# Patient Record
Sex: FEMALE | Race: AMERICAN INDIAN OR ALASKA NATIVE | NOT HISPANIC OR LATINO | URBAN - METROPOLITAN AREA
[De-identification: names, ages, dates, MRNs, and addresses within clinical notes are randomized per-mention and may not be internally consistent; named-entity substitution may affect disease eponyms.]

---

## 2018-08-17 ENCOUNTER — EMERGENCY (EMERGENCY)
Facility: HOSPITAL | Age: 58
LOS: 0 days | Discharge: HOME | End: 2018-08-17
Admitting: PHYSICIAN ASSISTANT

## 2018-08-17 VITALS
SYSTOLIC BLOOD PRESSURE: 159 MMHG | HEART RATE: 68 BPM | RESPIRATION RATE: 18 BRPM | TEMPERATURE: 98 F | OXYGEN SATURATION: 97 % | DIASTOLIC BLOOD PRESSURE: 75 MMHG

## 2018-08-17 DIAGNOSIS — S91.201A UNSPECIFIED OPEN WOUND OF RIGHT GREAT TOE WITH DAMAGE TO NAIL, INITIAL ENCOUNTER: ICD-10-CM
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
## 2018-08-17 DIAGNOSIS — M79.676 PAIN IN UNSPECIFIED TOE(S): ICD-10-CM
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
## 2018-08-17 RX ORDER — IBUPROFEN 200 MG
600 TABLET ORAL ONCE
Qty: 0 | Refills: 0 | Status: COMPLETED | OUTPATIENT
Start: 2018-08-17 | End: 2018-08-17

## 2018-08-17 RX ADMIN — Medication 600 MILLIGRAM(S): at 12:08

## 2018-08-17 NOTE — ED PROVIDER NOTE - NS ED ROS FT
Gen: Alert, NAD, well appearing  Ext: Tenderness to 1st digit of right foot with full avulsion of nail (pt did not bring nail) pedal pulse intact sensation intact  Neuro: AAOx3

## 2018-08-17 NOTE — ED PROVIDER NOTE - OBJECTIVE STATEMENT
Patient is a 57 years old F no pertinent pmhx  presents to the ED for evaluation of 1st digit of right foot pain after trauma to foot with avulsion of nail.

## 2018-08-17 NOTE — ED PROVIDER NOTE - PHYSICAL EXAMINATION
Gen: Alert, NAD, well appearing  Head: NC, AT, PERRL, EOMI  Ext: +nail avulsion of 1st digit of right foot   Neuro: AAOx3

## 2024-04-09 ENCOUNTER — EMERGENCY (EMERGENCY)
Facility: HOSPITAL | Age: 64
LOS: 0 days | Discharge: HOME | End: 2024-04-09
Attending: EMERGENCY MEDICINE
Payer: MEDICARE

## 2024-04-09 VITALS
TEMPERATURE: 98 F | SYSTOLIC BLOOD PRESSURE: 154 MMHG | RESPIRATION RATE: 16 BRPM | HEIGHT: 64.17 IN | OXYGEN SATURATION: 98 % | DIASTOLIC BLOOD PRESSURE: 94 MMHG | HEART RATE: 91 BPM | WEIGHT: 107.14 LBS

## 2024-04-09 DIAGNOSIS — R51.9 HEADACHE, UNSPECIFIED: ICD-10-CM

## 2024-04-09 DIAGNOSIS — H92.01 OTALGIA, RIGHT EAR: ICD-10-CM

## 2024-04-09 DIAGNOSIS — B02.9 ZOSTER WITHOUT COMPLICATIONS: ICD-10-CM

## 2024-04-09 DIAGNOSIS — R21 RASH AND OTHER NONSPECIFIC SKIN ERUPTION: ICD-10-CM

## 2024-04-09 PROCEDURE — 99284 EMERGENCY DEPT VISIT MOD MDM: CPT

## 2024-04-09 PROCEDURE — 99282 EMERGENCY DEPT VISIT SF MDM: CPT

## 2024-04-09 RX ORDER — FAMCICLOVIR 500 MG/1
1 TABLET, FILM COATED ORAL
Qty: 21 | Refills: 0
Start: 2024-04-09 | End: 2024-04-15

## 2024-04-09 RX ORDER — IBUPROFEN 200 MG
1 TABLET ORAL
Qty: 15 | Refills: 0
Start: 2024-04-09 | End: 2024-04-13

## 2024-04-09 NOTE — ED PROVIDER NOTE - NSFOLLOWUPINSTRUCTIONS_ED_ALL_ED_FT
Shingles    WHAT YOU NEED TO KNOW:    Shingles is a viral infection that causes a painful rash. Shingles is caused by the varicella-zoster virus. This is the same virus that causes chickenpox. The virus stays in your body after you have chickenpox, without causing any symptoms. Shingles occurs when the virus becomes active again. The active virus travels along a nerve to your skin and causes a rash. The rash usually lasts 2 to 3 weeks. Most people have shingles one time, but it is possible to develop it again.  Shingles         DISCHARGE INSTRUCTIONS:    Call your local emergency number (911 in the ) if:   •You have trouble moving your arms, legs, or face.      •You become confused, or have trouble speaking.      •You have a seizure.      Return to the emergency department if:   •You have weakness in an arm or leg.      •You have dizziness, a severe headache, or hearing or vision loss.      •You have painful, red, warm skin around the blisters, or the blisters drain pus.      •Your neck is stiff or you have trouble moving it.      Call your doctor if:   •A painful rash appears near your eye.      •The rash spreads to more areas and your pain worsens.      •You feel weak or have a headache.      •You have a cough, chills, or a fever.      •You have abdominal pain or nausea, or you are vomiting.      •You have questions or concerns about your condition or care.      Medicines: You may need any of the following:  •Antiviral medicine fights the virus causing your shingles. Start this medicine within 3 days after you notice the first symptoms. This may help prevent nerve pain. A shingles outbreak can cause nerve pain called post-herpetic neuralgia (PHN). PHN can last a long time after you heal from shingles.      •Topical anesthetics are used to numb the skin and decrease pain. They can be a cream, gel, spray, or patch.      •Anticonvulsants and antidepressants decrease nerve pain and may help you sleep at night.      •Antihistamines may help decrease itching.      •Acetaminophen decreases pain and fever. It is available without a doctor's order. Ask how much to take and how often to take it. Follow directions. Read the labels of all other medicines you are using to see if they also contain acetaminophen, or ask your doctor or pharmacist. Acetaminophen can cause liver damage if not taken correctly.      •NSAIDs, such as ibuprofen, help decrease swelling, pain, and fever. This medicine is available with or without a doctor's order. NSAIDs can cause stomach bleeding or kidney problems in certain people. If you take blood thinner medicine, always ask your healthcare provider if NSAIDs are safe for you. Always read the medicine label and follow directions.      •A steroid and numbing medicine injection may decrease severe pain that does not get better with other medicines.      •Take your medicine as directed. Contact your healthcare provider if you think your medicine is not helping or if you have side effects. Tell your provider if you are allergic to any medicine. Keep a list of the medicines, vitamins, and herbs you take. Include the amounts, and when and why you take them. Bring the list or the pill bottles to follow-up visits. Carry your medicine list with you in case of an emergency.      Self-care:   •Apply a cool, wet compress or take a cool bath. This may help decrease itching and pain.      •Keep your rash clean and dry. Cover your rash with a bandage. Do not use bandages with adhesive. Clothes may irritate your skin.      Prevent the spread of the shingles virus: The virus can be passed to a person who has never had chickenpox. This usually happens if the other person comes in contact with your open sores. This person may get chickenpox, but not shingles. You are contagious until your blisters scab over. Stay away from people who have not had chickenpox or the chickenpox vaccine. Avoid pregnant women, newborns, and people with weak immune systems. They have a higher risk of infection.   •Wash your hands often. Wash your hands several times each day. Wash after you use the bathroom, change a child's diaper, and before you prepare or eat food. Use soap and water every time. Rub your soapy hands together, lacing your fingers. Wash the front and back of your hands, and in between your fingers. Use the fingers of one hand to scrub under the fingernails of the other hand. Wash for at least 20 seconds. Rinse with warm, running water for several seconds. Then dry your hands with a clean towel or paper towel. Use hand  that contains alcohol if soap and water are not available. Do not touch your eyes, nose, or mouth without washing your hands first.  Handwashing           •Cover a sneeze or cough. Use a tissue that covers your mouth and nose. Throw the tissue away in a trash can right away. Use the bend of your arm if a tissue is not available. Wash your hands well with soap and water or use a hand .             Prevent shingles or another shingles outbreak:   •A vaccine may be given to help prevent shingles. You can get the vaccine even if you already had shingles. The vaccine comes in 2 forms. A 2-dose vaccine is usually given to adults 50 years or older. A 1-dose vaccine may be given to adults 60 years or older.      •The vaccine can help prevent a future outbreak. If you do get shingles again, the vaccine can keep it from becoming severe. Ask your healthcare provider about other vaccines you may need.      Follow up with your doctor as directed: Write down your questions so you remember to ask them during your visits.

## 2024-04-09 NOTE — ED PROVIDER NOTE - CLINICAL SUMMARY MEDICAL DECISION MAKING FREE TEXT BOX
63-year-old female no significant past medical history brought to the ED from home with 6 days of right-sided headache and 3 days of rash to the right side of the head.  Patient with herpes zoster.  Patient prescribed steroids and antivirals.  Patient and family educated on shingles and contagious nature of the disease.  Patient will follow-up with PMD and given return instructions.

## 2024-04-09 NOTE — ED ADULT NURSE NOTE - LANGUAGE ASSISTANCE NEEDED
estradiol (ESTRACE VAGINAL) 0.1 MG/GM vaginal cream Place 1 g vaginally Twice a Week 1 Tube 5    estradiol (ESTRACE) 1 MG tablet TAKE ONE TABLET BY MOUTH ONCE DAILY (Patient taking differently: TAKE ONE TABLET EVERY OTHER DAY) 90 tablet 3    aspirin 81 MG tablet Take 81 mg by mouth daily      diclofenac sodium 1 % GEL Apply 2 g topically 4 times daily as needed for Pain 5 Tube 11     No current facility-administered medications for this visit. No Known Allergies    Health Maintenance   Topic Date Due    Hepatitis C screen  1950    Shingles Vaccine (1 of 2 - 2 Dose Series) 07/24/1998    DTaP/Tdap/Td vaccine (1 - Tdap) 07/15/2024 (Originally 7/16/2014)    Flu vaccine (1) 09/01/2018    Potassium monitoring  01/18/2019    Creatinine monitoring  01/18/2019    Breast cancer screen  06/05/2020    Colon cancer screen colonoscopy  10/12/2021    Lipid screen  01/18/2023    DEXA (modify frequency per FRAX score)  Completed    Pneumococcal low/med risk  Completed       Subjective:      Review of Systems   Constitutional: Negative for chills and fever. HENT: Negative for congestion. Respiratory: Negative for cough, chest tightness and shortness of breath. Cardiovascular: Negative for chest pain, palpitations and leg swelling. Gastrointestinal: Negative for abdominal pain, anal bleeding, constipation, diarrhea and nausea. Genitourinary: Negative for difficulty urinating. Psychiatric/Behavioral: Negative. See HPI for visit specific review of symptoms. All others negative      Objective:   /88   Pulse 73   Temp 96.9 °F (36.1 °C)   Resp 18   Ht 5' 2\" (1.575 m)   Wt 165 lb (74.8 kg)   SpO2 97%   BMI 30.18 kg/m²   Physical Exam   Constitutional: She appears well-developed. She does not appear ill. Eyes: Pupils are equal, round, and reactive to light. Neck: Normal range of motion. Neck supple. Cardiovascular: Normal rate and regular rhythm.   Exam reveals no friction triamterene-hydrochlorothiazide (MAXZIDE) 75-50 MG per tablet; Take 0.5 tablets by mouth Twice a Week  Dispense: 90 tablet; Refill: 3    3. Gastroesophageal reflux disease without esophagitis  Continue with Zantac    4. Lipid screening  Lab Results   Component Value Date    CHOL 182 01/18/2018     Lab Results   Component Value Date    TRIG 111 01/18/2018     Lab Results   Component Value Date    HDL 57 (L) 01/18/2018     Lab Results   Component Value Date    LDLCALC 103 01/18/2018     No results found for: LABVLDL, VLDL  No results found for: CHOLHDLRATIO    - Lipid Panel; Future    Recheck lipids next visit    Suggested she continue to wean from her estradiol  Return in about 6 months (around 2/28/2019) for Routine follow up - 15 minute visit. 90 days pharmacy     Discussed use, benefit, and side effects of prescribed medications. All patient questions answered. Pt voiced understanding. Reviewed health maintenance. Instructed to continue current medications, diet and exercise. Patient agreed with treatment plan. Follow up as directed. Yes-Patient/Caregiver accepts free interpretation services...

## 2024-04-09 NOTE — ED PROVIDER NOTE - PROGRESS NOTE DETAILS
Med  Elizabeth [id number] used for history and physical and for discharge instructions. Instructions on medications and shingles and stomach upset with steroids and Motrin given to patient and daughter with Mandarin .  Patient struck to follow-up with PMD and given return instructions.

## 2024-04-09 NOTE — ED ADULT NURSE NOTE - NSFALLRISKINTERV_ED_ALL_ED

## 2024-04-09 NOTE — ED PROVIDER NOTE - OBJECTIVE STATEMENT
63-year-old female no significant past medical history brought to the ED from home by her daughter with 6 days of right-sided headache and right ear pain and noted a rash 3 days ago in the same area.  Patient was seen by the PMD, Dr. Villegas, twice over the past few days and was prescribed sumatriptan for headache with no relief.  No facial rash.  No rash on the nose.  No fever, chills.  No nausea, vomiting.  Patient unsure of a history of chickenpox.  No neck stiffness.  Patient denies any other complaints.  Patient reports difficulty sleeping due to the pain.  Patient has been taking Tylenol for the pain with no relief.

## 2024-04-09 NOTE — ED PROVIDER NOTE - PHYSICAL EXAMINATION
CONSTITUTIONAL: Well-appearing; well-nourished; in no apparent distress.   HEAD: Normocephalic; atraumatic.   EYES: PERRL; EOM intact. Conjunctiva normal B/L.   ENT: Normal pharynx with no tonsillar hypertrophy. MMM.  NECK: Supple; non-tender; no cervical lymphadenopathy.   CHEST: Normal chest excursion with respiration.   SKIN: Vesicular, grouped lesions over C 4/5 distribution on the right neck.  Some lesions scab.  No facial lesions.  No Zuñiga sign.  No oral lesions.  NEURO: A & O x 4; CN 2-12 intact. Grossly unremarkable.

## 2024-04-09 NOTE — ED PROVIDER NOTE - PATIENT PORTAL LINK FT
You can access the FollowMyHealth Patient Portal offered by [facility] by registering at the following website: [email or web address]. By joining [employer]’s FollowMyHealth portal, you will also be able to view your health information using other applications (apps) compatible with our system.

## 2024-04-11 ENCOUNTER — EMERGENCY (EMERGENCY)
Facility: HOSPITAL | Age: 64
LOS: 0 days | Discharge: HOME | End: 2024-04-12
Attending: EMERGENCY MEDICINE
Payer: MEDICARE

## 2024-04-11 VITALS
HEART RATE: 70 BPM | OXYGEN SATURATION: 97 % | SYSTOLIC BLOOD PRESSURE: 183 MMHG | DIASTOLIC BLOOD PRESSURE: 84 MMHG | TEMPERATURE: 97 F

## 2024-04-11 VITALS
TEMPERATURE: 98 F | OXYGEN SATURATION: 95 % | HEIGHT: 64.17 IN | SYSTOLIC BLOOD PRESSURE: 193 MMHG | DIASTOLIC BLOOD PRESSURE: 94 MMHG | RESPIRATION RATE: 18 BRPM | WEIGHT: 106.92 LBS | HEART RATE: 87 BPM

## 2024-04-11 DIAGNOSIS — J02.9 ACUTE PHARYNGITIS, UNSPECIFIED: ICD-10-CM

## 2024-04-11 DIAGNOSIS — B02.9 ZOSTER WITHOUT COMPLICATIONS: ICD-10-CM

## 2024-04-11 DIAGNOSIS — R21 RASH AND OTHER NONSPECIFIC SKIN ERUPTION: ICD-10-CM

## 2024-04-11 PROCEDURE — 99283 EMERGENCY DEPT VISIT LOW MDM: CPT

## 2024-04-11 PROCEDURE — 99284 EMERGENCY DEPT VISIT MOD MDM: CPT

## 2024-04-11 RX ORDER — OXYCODONE HYDROCHLORIDE 5 MG/1
1 TABLET ORAL
Qty: 12 | Refills: 0
Start: 2024-04-11 | End: 2024-04-13

## 2024-04-11 NOTE — ED ADULT NURSE NOTE - CHIEF COMPLAINT QUOTE
She has painful rashes on her right side head, neck x 7 days and now spreading to her back, getting worst and that she has hard time swallowing and her head hurts a lot as per daughter  thru . Seen 3 days ago w/ same complaints Dx shingles w/ RX given but not improving

## 2024-04-11 NOTE — ED ADULT TRIAGE NOTE - CHIEF COMPLAINT QUOTE
She has painful rashes on her right side head x 7 days and her back, now getting worst, spreading to her back and that she has hard time swallowing and her head hurts a lot as per daughter  thru . Seen 3 days ago w/ same complaints w/ RX given She has painful rashes on her right side head, neck x 7 days and now spreading to her back, getting worst and that she has hard time swallowing and her head hurts a lot as per daughter  thru . Seen 3 days ago w/ same complaints w/ RX given but not improving She has painful rashes on her right side head, neck x 7 days and now spreading to her back, getting worst and that she has hard time swallowing and her head hurts a lot as per daughter  thru . Seen 3 days ago w/ same complaints Dx shingles w/ RX given but not improving

## 2024-04-11 NOTE — ED PROVIDER NOTE - PHYSICAL EXAMINATION
CONST: Well appearing in NAD  EYES: PERRL, EOMI, Sclera and conjunctiva clear.   ENT: Oropharynx normal appearing, no erythema or exudates. Uvula midline. TM clear b/l   CARD: Normal S1 S2; Normal rate and rhythm  RESP: Equal BS B/L, No wheezes, rhonchi or rales. No distress  GI: Soft, non-tender, non-distended.  MS: Normal ROM in all extremities. No midline spinal tenderness.  SKIN: erythematous vesicular rash extending from the auricular area to te neck in a dermatomal pattern does not cross midline.   NEURO: A&Ox3, No focal deficits. Strength 5/5 with no sensory deficits. Steady gait

## 2024-04-11 NOTE — ED PROVIDER NOTE - ATTENDING APP SHARED VISIT CONTRIBUTION OF CARE
I personally evaluated the patient. I reviewed the Resident’s or Physician Assistant’s note (as assigned above), and agree with the findings and plan except as documented in my note.  63-year-old female with no significant past medical history is returning symptoms after ED visit for right sided shingles on her scalp.  Patient was discharged with famciclovir, prednisone and ibuprofen.  Patient and daughter states that she has been compliant with her medication however she is still in a lot of pain.  Any movement of her eyes, fever.  VSS, non toxic appearing, NAD, Head NCAT, MMM, neck supple, normal ROM, normal s1s2, lungs ctab, abd s/nt/nd, no guarding or rebound, extremities wnl, AAO x 3, GCS 15, neuro grossly normal.  Vesicular and papular lesions on the right scalp, down to the posterior neck, no auricular or ophthalmic involvement of lesions. Plan is improved pain control with opioids, anticipatory guidance and discharge outpatient follow-up.

## 2024-04-11 NOTE — ED ADULT NURSE NOTE - NSFALLUNIVINTERV_ED_ALL_ED
Bed/Stretcher in lowest position, wheels locked, appropriate side rails in place/Call bell, personal items and telephone in reach/Instruct patient to call for assistance before getting out of bed/chair/stretcher/Non-slip footwear applied when patient is off stretcher/[place] to call system/Physically safe environment - no spills, clutter or unnecessary equipment/Purposeful proactive rounding/Room/bathroom lighting operational, light cord in reach

## 2024-04-11 NOTE — ED PROVIDER NOTE - CLINICAL SUMMARY MEDICAL DECISION MAKING FREE TEXT BOX
Patient with diagnosis shingles on antiviral, steroids and NSAIDs is returning due to uncontrolled pain.  Patient has no concerning features namely auricular lesions or zoster ophthalmic rest.  Spoke with patient and daughter using Mandarin  and gave anticipatory guidance.  Patient treated with Percocet in the ED and will be discharged with outpatient follow-up.

## 2024-04-11 NOTE — ED PROVIDER NOTE - OBJECTIVE STATEMENT
63-year-old female no significant past medical history presents to the ED for evaluation of rash.  Patient with approximately 1 week of right-sided ear pain and rash to face now spreading to the neck and chest over the last several days.  Was seen in the ED 3 days ago prescribed antivirals, steroids and ibuprofen which they have been taking as prescribed however rash continues to worsen and pain has not been well-controlled.  Patient also with sore throat.  Denies any fevers, chills, nausea, vomiting, vision changes, neck stiffness, diarrhea.

## 2024-04-11 NOTE — ED PROVIDER NOTE - PATIENT PORTAL LINK FT
You can access the FollowMyHealth Patient Portal offered by [facility] by registering at the following website: [email or web address]. By joining [employer]’s FollowMyHealth portal, you will also be able to view your health information using other applications (apps) compatible with our system.

## 2024-08-28 NOTE — ED ADULT NURSE NOTE - NEGATIVE SCREENING
Preethi Roberto is a 51 y.o. female and presents with Follow-Up from Hospital (PE)  .  Subjective:    Hypertension Review:  The patient has hypertension .  Diet and Lifestyle: generally follows a low sodium diet, exercises sporadically  Home BP Monitoring: is not measured at home.  Pertinent ROS: taking medications as instructed, no medication side effects noted, no TIA's, no chest pain on exertion, no dyspnea on exertion, no swelling of ankles.    Dyslipidemia Review:  Patient presents for evaluation of lipids.  Compliance with treatment thus far has been excellent.  A repeat fasting lipid profile was done.  The patient does not use medications that may worsen dyslipidemias . The patient exercises sporadically.    She has a PE and is on Eliquis    Anxiety review:  Patient complains of difficulty concentrating, feelings of losing control, irritable, psychomotor agitation, racing thoughts  Treatment includes  none  and none.   She denies recurrent thoughts of death and suicidal thoughts without plan.   She experiences the following side effects from the treatment: none.         Review of Systems  Constitutional: negative for fevers, chills, anorexia and weight loss  Eyes:   negative for visual disturbance and irritation  ENT:   negative for tinnitus,sore throat,nasal congestion,ear pains.hoarseness  Respiratory:  negative for cough, hemoptysis, dyspnea,wheezing  CV:   negative for chest pain, palpitations, lower extremity edema  GI:   negative for nausea, vomiting, diarrhea, abdominal pain,melena  Endo:               negative for polyuria,polydipsia,polyphagia,heat intolerance  Genitourinary: negative for frequency, dysuria and hematuria  Integument:  negative for rash and pruritus  Hematologic:  negative for easy bruising and gum/nose bleeding  Musculoskel: negative for myalgias, arthralgias, back pain, muscle weakness, joint pain  Neurological:  negative for headaches, dizziness, vertigo, memory problems and gait  . noted      No results found for this visit on 08/28/24.    Assessment/Plan:    ICD-10-CM    1. Hx pulmonary embolism  Z86.711       2. Hospital discharge follow-up  Z09 TX DISCHARGE MEDS RECONCILED W/ CURRENT OUTPATIENT MED LIST      3. Morbid (severe) obesity due to excess calories (HCC)  E66.01       4. Essential (primary) hypertension  I10       5. Anxiety and depression  F41.9     F32.A         Orders Placed This Encounter   Procedures    TX DISCHARGE MEDS RECONCILED W/ CURRENT OUTPATIENT MED LIST     call if any problems,  There are no Patient Instructions on file for this visit.   Follow-up and Dispositions    Return in about 4 weeks (around 9/25/2024), or if symptoms worsen or fail to improve.           I have reviewed with the patient details of the assessment and plan and all questions were answered. Relevent patient education was performed.The most recent lab findings were reviewed with the patient.    An After Visit Summary was printed and given to the patient.

## 2024-11-25 ENCOUNTER — OFFICE (OUTPATIENT)
Dept: URBAN - METROPOLITAN AREA CLINIC 76 | Facility: CLINIC | Age: 64
Setting detail: OPHTHALMOLOGY
End: 2024-11-25
Payer: COMMERCIAL

## 2024-11-25 ENCOUNTER — RX ONLY (RX ONLY)
Age: 64
End: 2024-11-25

## 2024-11-25 DIAGNOSIS — H40.033: ICD-10-CM

## 2024-11-25 DIAGNOSIS — S05.02XA: ICD-10-CM

## 2024-11-25 DIAGNOSIS — T15.12XA: ICD-10-CM

## 2024-11-25 PROBLEM — H43.811 POSTERIOR VITREOUS DETACHMENT; RIGHT EYE: Status: ACTIVE | Noted: 2024-11-25

## 2024-11-25 PROBLEM — Z96.1 PSEUDOPHAKIA ; BOTH EYES: Status: ACTIVE | Noted: 2024-11-25

## 2024-11-25 PROCEDURE — 92004 COMPRE OPH EXAM NEW PT 1/>: CPT | Performed by: OPTOMETRIST

## 2024-11-25 PROCEDURE — 65205 REMOVE FOREIGN BODY FROM EYE: CPT | Mod: LT | Performed by: OPTOMETRIST

## 2024-11-25 ASSESSMENT — CORNEAL EDEMA - FOLDS/STRIAE: OS_FOLDSSTRIAE: 1+

## 2024-11-25 ASSESSMENT — CORNEAL TRAUMA - ABRASION: OS_ABRASION: PRESENT

## 2024-11-25 ASSESSMENT — REFRACTION_AUTOREFRACTION
OS_SPHERE: +1.00
OD_CYLINDER: -0.75
OS_AXIS: 98
OD_AXIS: 100
OD_SPHERE: -0.75
OS_CYLINDER: -1.50

## 2024-11-25 ASSESSMENT — LID EXAM ASSESSMENTS: OS_EDEMA: LUL 1+

## 2024-11-25 ASSESSMENT — CONFRONTATIONAL VISUAL FIELD TEST (CVF)
OD_FINDINGS: FULL
OS_FINDINGS: FULL

## 2024-11-25 ASSESSMENT — VISUAL ACUITY
OD_BCVA: 20/200
OS_BCVA: 20/60

## 2024-11-25 ASSESSMENT — KERATOMETRY
OS_AXISANGLE_DEGREES: 4
OD_AXISANGLE_DEGREES: 6
OS_K2POWER_DIOPTERS: 44.75
OD_K1POWER_DIOPTERS: 44.50
OD_K2POWER_DIOPTERS: 45.00
OS_K1POWER_DIOPTERS: 43.25

## 2024-11-25 ASSESSMENT — CORNEAL TRAUMA - FOREIGN BODY: OS_FOREIGNBODY: PRESENT

## 2024-11-25 ASSESSMENT — CORNEAL EDEMA CLINICAL DESCRIPTION: OS_CORNEALEDEMA: TEMPORAL

## 2024-11-25 ASSESSMENT — CORNEAL TRAUMA: OS_TRAUMA: CLOCK

## 2025-04-24 ENCOUNTER — INPATIENT (INPATIENT)
Facility: HOSPITAL | Age: 65
LOS: 3 days | Discharge: HOME | DRG: 603 | End: 2025-04-28
Attending: INTERNAL MEDICINE | Admitting: STUDENT IN AN ORGANIZED HEALTH CARE EDUCATION/TRAINING PROGRAM
Payer: MEDICARE

## 2025-04-24 VITALS
TEMPERATURE: 98 F | DIASTOLIC BLOOD PRESSURE: 83 MMHG | RESPIRATION RATE: 18 BRPM | WEIGHT: 104.94 LBS | SYSTOLIC BLOOD PRESSURE: 135 MMHG | HEART RATE: 114 BPM | OXYGEN SATURATION: 100 %

## 2025-04-24 DIAGNOSIS — L03.90 CELLULITIS, UNSPECIFIED: ICD-10-CM

## 2025-04-24 LAB
ALBUMIN SERPL ELPH-MCNC: 4.1 G/DL — SIGNIFICANT CHANGE UP (ref 3.5–5.2)
ALP SERPL-CCNC: 105 U/L — SIGNIFICANT CHANGE UP (ref 30–115)
ALT FLD-CCNC: 23 U/L — SIGNIFICANT CHANGE UP (ref 0–41)
ANION GAP SERPL CALC-SCNC: 11 MMOL/L — SIGNIFICANT CHANGE UP (ref 7–14)
APTT BLD: 31.5 SEC — SIGNIFICANT CHANGE UP (ref 27–39.2)
AST SERPL-CCNC: 20 U/L — SIGNIFICANT CHANGE UP (ref 0–41)
BASOPHILS # BLD AUTO: 0.02 K/UL — SIGNIFICANT CHANGE UP (ref 0–0.2)
BASOPHILS NFR BLD AUTO: 0.2 % — SIGNIFICANT CHANGE UP (ref 0–1)
BILIRUB SERPL-MCNC: 0.6 MG/DL — SIGNIFICANT CHANGE UP (ref 0.2–1.2)
BUN SERPL-MCNC: 13 MG/DL — SIGNIFICANT CHANGE UP (ref 10–20)
CALCIUM SERPL-MCNC: 9.4 MG/DL — SIGNIFICANT CHANGE UP (ref 8.4–10.5)
CHLORIDE SERPL-SCNC: 100 MMOL/L — SIGNIFICANT CHANGE UP (ref 98–110)
CO2 SERPL-SCNC: 25 MMOL/L — SIGNIFICANT CHANGE UP (ref 17–32)
CREAT SERPL-MCNC: 0.6 MG/DL — LOW (ref 0.7–1.5)
CRP SERPL-MCNC: 66.2 MG/L — HIGH
EGFR: 99 ML/MIN/1.73M2 — SIGNIFICANT CHANGE UP
EGFR: 99 ML/MIN/1.73M2 — SIGNIFICANT CHANGE UP
EOSINOPHIL # BLD AUTO: 0.05 K/UL — SIGNIFICANT CHANGE UP (ref 0–0.7)
EOSINOPHIL NFR BLD AUTO: 0.5 % — SIGNIFICANT CHANGE UP (ref 0–8)
ERYTHROCYTE [SEDIMENTATION RATE] IN BLOOD: 87 MM/HR — HIGH (ref 0–20)
GLUCOSE SERPL-MCNC: 116 MG/DL — HIGH (ref 70–99)
HCT VFR BLD CALC: 38.7 % — SIGNIFICANT CHANGE UP (ref 37–47)
HGB BLD-MCNC: 12.7 G/DL — SIGNIFICANT CHANGE UP (ref 12–16)
IMM GRANULOCYTES NFR BLD AUTO: 0.3 % — SIGNIFICANT CHANGE UP (ref 0.1–0.3)
INR BLD: 0.94 RATIO — SIGNIFICANT CHANGE UP (ref 0.65–1.3)
LACTATE SERPL-SCNC: 1.1 MMOL/L — SIGNIFICANT CHANGE UP (ref 0.7–2)
LYMPHOCYTES # BLD AUTO: 1.35 K/UL — SIGNIFICANT CHANGE UP (ref 1.2–3.4)
LYMPHOCYTES # BLD AUTO: 14.4 % — LOW (ref 20.5–51.1)
MCHC RBC-ENTMCNC: 30.2 PG — SIGNIFICANT CHANGE UP (ref 27–31)
MCHC RBC-ENTMCNC: 32.8 G/DL — SIGNIFICANT CHANGE UP (ref 32–37)
MCV RBC AUTO: 91.9 FL — SIGNIFICANT CHANGE UP (ref 81–99)
MONOCYTES # BLD AUTO: 1.13 K/UL — HIGH (ref 0.1–0.6)
MONOCYTES NFR BLD AUTO: 12 % — HIGH (ref 1.7–9.3)
NEUTROPHILS # BLD AUTO: 6.81 K/UL — HIGH (ref 1.4–6.5)
NEUTROPHILS NFR BLD AUTO: 72.6 % — SIGNIFICANT CHANGE UP (ref 42.2–75.2)
NRBC BLD AUTO-RTO: 0 /100 WBCS — SIGNIFICANT CHANGE UP (ref 0–0)
PLATELET # BLD AUTO: 260 K/UL — SIGNIFICANT CHANGE UP (ref 130–400)
PMV BLD: 9.4 FL — SIGNIFICANT CHANGE UP (ref 7.4–10.4)
POTASSIUM SERPL-MCNC: 5 MMOL/L — SIGNIFICANT CHANGE UP (ref 3.5–5)
POTASSIUM SERPL-SCNC: 5 MMOL/L — SIGNIFICANT CHANGE UP (ref 3.5–5)
PROT SERPL-MCNC: 7.7 G/DL — SIGNIFICANT CHANGE UP (ref 6–8)
PROTHROM AB SERPL-ACNC: 11.1 SEC — SIGNIFICANT CHANGE UP (ref 9.95–12.87)
RBC # BLD: 4.21 M/UL — SIGNIFICANT CHANGE UP (ref 4.2–5.4)
RBC # FLD: 14.6 % — HIGH (ref 11.5–14.5)
SODIUM SERPL-SCNC: 136 MMOL/L — SIGNIFICANT CHANGE UP (ref 135–146)
URATE SERPL-MCNC: 1.8 MG/DL — LOW (ref 2.5–7)
WBC # BLD: 9.39 K/UL — SIGNIFICANT CHANGE UP (ref 4.8–10.8)
WBC # FLD AUTO: 9.39 K/UL — SIGNIFICANT CHANGE UP (ref 4.8–10.8)

## 2025-04-24 PROCEDURE — 85610 PROTHROMBIN TIME: CPT

## 2025-04-24 PROCEDURE — 84145 PROCALCITONIN (PCT): CPT

## 2025-04-24 PROCEDURE — 86140 C-REACTIVE PROTEIN: CPT

## 2025-04-24 PROCEDURE — 83036 HEMOGLOBIN GLYCOSYLATED A1C: CPT

## 2025-04-24 PROCEDURE — 73201 CT UPPER EXTREMITY W/DYE: CPT | Mod: 26,RT

## 2025-04-24 PROCEDURE — 73110 X-RAY EXAM OF WRIST: CPT | Mod: 26,RT

## 2025-04-24 PROCEDURE — 80202 ASSAY OF VANCOMYCIN: CPT

## 2025-04-24 PROCEDURE — T1013: CPT

## 2025-04-24 PROCEDURE — 85025 COMPLETE CBC W/AUTO DIFF WBC: CPT

## 2025-04-24 PROCEDURE — 73130 X-RAY EXAM OF HAND: CPT | Mod: 26,RT

## 2025-04-24 PROCEDURE — 80053 COMPREHEN METABOLIC PANEL: CPT

## 2025-04-24 PROCEDURE — 83735 ASSAY OF MAGNESIUM: CPT

## 2025-04-24 PROCEDURE — 85652 RBC SED RATE AUTOMATED: CPT

## 2025-04-24 PROCEDURE — 73090 X-RAY EXAM OF FOREARM: CPT | Mod: 26,RT

## 2025-04-24 PROCEDURE — 82962 GLUCOSE BLOOD TEST: CPT

## 2025-04-24 PROCEDURE — 99223 1ST HOSP IP/OBS HIGH 75: CPT

## 2025-04-24 PROCEDURE — 86850 RBC ANTIBODY SCREEN: CPT

## 2025-04-24 PROCEDURE — 86901 BLOOD TYPING SEROLOGIC RH(D): CPT

## 2025-04-24 PROCEDURE — 73201 CT UPPER EXTREMITY W/DYE: CPT | Mod: MC,RT

## 2025-04-24 PROCEDURE — 36415 COLL VENOUS BLD VENIPUNCTURE: CPT

## 2025-04-24 PROCEDURE — 86900 BLOOD TYPING SEROLOGIC ABO: CPT

## 2025-04-24 PROCEDURE — 99285 EMERGENCY DEPT VISIT HI MDM: CPT

## 2025-04-24 RX ORDER — ASPIRIN 325 MG
1 TABLET ORAL
Refills: 0 | DISCHARGE

## 2025-04-24 RX ORDER — EZETIMIBE 10 MG/1
10 TABLET ORAL DAILY
Refills: 0 | Status: DISCONTINUED | OUTPATIENT
Start: 2025-04-24 | End: 2025-04-28

## 2025-04-24 RX ORDER — SODIUM CHLORIDE 9 G/1000ML
1000 INJECTION, SOLUTION INTRAVENOUS
Refills: 0 | Status: DISCONTINUED | OUTPATIENT
Start: 2025-04-24 | End: 2025-04-28

## 2025-04-24 RX ORDER — PIPERACILLIN-TAZO-DEXTROSE,ISO 2.25G/50ML
3.38 IV SOLUTION, PIGGYBACK PREMIX FROZEN(ML) INTRAVENOUS ONCE
Refills: 0 | Status: COMPLETED | OUTPATIENT
Start: 2025-04-25 | End: 2025-04-25

## 2025-04-24 RX ORDER — LOSARTAN POTASSIUM 100 MG/1
1 TABLET, FILM COATED ORAL
Refills: 0 | DISCHARGE

## 2025-04-24 RX ORDER — EZETIMIBE 10 MG/1
1 TABLET ORAL
Refills: 0 | DISCHARGE

## 2025-04-24 RX ORDER — VANCOMYCIN HCL IN 5 % DEXTROSE 1.5G/250ML
500 PLASTIC BAG, INJECTION (ML) INTRAVENOUS EVERY 12 HOURS
Refills: 0 | Status: DISCONTINUED | OUTPATIENT
Start: 2025-04-25 | End: 2025-04-25

## 2025-04-24 RX ORDER — GABAPENTIN 400 MG/1
2 CAPSULE ORAL
Refills: 0 | DISCHARGE

## 2025-04-24 RX ORDER — PIPERACILLIN-TAZO-DEXTROSE,ISO 2.25G/50ML
3.38 IV SOLUTION, PIGGYBACK PREMIX FROZEN(ML) INTRAVENOUS ONCE
Refills: 0 | Status: COMPLETED | OUTPATIENT
Start: 2025-04-24 | End: 2025-04-24

## 2025-04-24 RX ORDER — INSULIN LISPRO 100 U/ML
INJECTION, SOLUTION INTRAVENOUS; SUBCUTANEOUS
Refills: 0 | Status: DISCONTINUED | OUTPATIENT
Start: 2025-04-24 | End: 2025-04-28

## 2025-04-24 RX ORDER — GABAPENTIN 400 MG/1
200 CAPSULE ORAL DAILY
Refills: 0 | Status: DISCONTINUED | OUTPATIENT
Start: 2025-04-24 | End: 2025-04-28

## 2025-04-24 RX ORDER — SODIUM CHLORIDE 9 G/1000ML
1000 INJECTION, SOLUTION INTRAVENOUS
Refills: 0 | Status: DISCONTINUED | OUTPATIENT
Start: 2025-04-24 | End: 2025-04-25

## 2025-04-24 RX ORDER — VANCOMYCIN HCL IN 5 % DEXTROSE 1.5G/250ML
PLASTIC BAG, INJECTION (ML) INTRAVENOUS
Refills: 0 | Status: DISCONTINUED | OUTPATIENT
Start: 2025-04-24 | End: 2025-04-24

## 2025-04-24 RX ORDER — ASPIRIN 325 MG
81 TABLET ORAL DAILY
Refills: 0 | Status: DISCONTINUED | OUTPATIENT
Start: 2025-04-24 | End: 2025-04-28

## 2025-04-24 RX ORDER — ROSUVASTATIN CALCIUM 20 MG/1
40 TABLET, FILM COATED ORAL AT BEDTIME
Refills: 0 | Status: DISCONTINUED | OUTPATIENT
Start: 2025-04-24 | End: 2025-04-28

## 2025-04-24 RX ORDER — ENOXAPARIN SODIUM 100 MG/ML
40 INJECTION SUBCUTANEOUS EVERY 24 HOURS
Refills: 0 | Status: DISCONTINUED | OUTPATIENT
Start: 2025-04-24 | End: 2025-04-28

## 2025-04-24 RX ORDER — MEROPENEM 1 G/30ML
INJECTION INTRAVENOUS
Refills: 0 | Status: DISCONTINUED | OUTPATIENT
Start: 2025-04-24 | End: 2025-04-24

## 2025-04-24 RX ORDER — DEXTROSE 50 % IN WATER 50 %
12.5 SYRINGE (ML) INTRAVENOUS ONCE
Refills: 0 | Status: DISCONTINUED | OUTPATIENT
Start: 2025-04-24 | End: 2025-04-28

## 2025-04-24 RX ORDER — METFORMIN HYDROCHLORIDE 850 MG/1
1 TABLET ORAL
Refills: 0 | DISCHARGE

## 2025-04-24 RX ORDER — MEROPENEM 1 G/30ML
1000 INJECTION INTRAVENOUS EVERY 8 HOURS
Refills: 0 | Status: DISCONTINUED | OUTPATIENT
Start: 2025-04-24 | End: 2025-04-24

## 2025-04-24 RX ORDER — VANCOMYCIN HCL IN 5 % DEXTROSE 1.5G/250ML
1000 PLASTIC BAG, INJECTION (ML) INTRAVENOUS ONCE
Refills: 0 | Status: DISCONTINUED | OUTPATIENT
Start: 2025-04-24 | End: 2025-04-24

## 2025-04-24 RX ORDER — PIPERACILLIN-TAZO-DEXTROSE,ISO 2.25G/50ML
3.38 IV SOLUTION, PIGGYBACK PREMIX FROZEN(ML) INTRAVENOUS EVERY 8 HOURS
Refills: 0 | Status: DISCONTINUED | OUTPATIENT
Start: 2025-04-25 | End: 2025-04-25

## 2025-04-24 RX ORDER — MEROPENEM 1 G/30ML
1000 INJECTION INTRAVENOUS ONCE
Refills: 0 | Status: COMPLETED | OUTPATIENT
Start: 2025-04-24 | End: 2025-04-24

## 2025-04-24 RX ORDER — LOSARTAN POTASSIUM 100 MG/1
25 TABLET, FILM COATED ORAL DAILY
Refills: 0 | Status: DISCONTINUED | OUTPATIENT
Start: 2025-04-24 | End: 2025-04-28

## 2025-04-24 RX ORDER — ROSUVASTATIN CALCIUM 20 MG/1
1 TABLET, FILM COATED ORAL
Refills: 0 | DISCHARGE

## 2025-04-24 RX ORDER — DEXTROSE 50 % IN WATER 50 %
25 SYRINGE (ML) INTRAVENOUS ONCE
Refills: 0 | Status: DISCONTINUED | OUTPATIENT
Start: 2025-04-24 | End: 2025-04-28

## 2025-04-24 RX ORDER — DEXTROSE 50 % IN WATER 50 %
15 SYRINGE (ML) INTRAVENOUS ONCE
Refills: 0 | Status: DISCONTINUED | OUTPATIENT
Start: 2025-04-24 | End: 2025-04-28

## 2025-04-24 RX ORDER — CEFAZOLIN SODIUM IN 0.9 % NACL 3 G/100 ML
2000 INTRAVENOUS SOLUTION, PIGGYBACK (ML) INTRAVENOUS ONCE
Refills: 0 | Status: COMPLETED | OUTPATIENT
Start: 2025-04-24 | End: 2025-04-24

## 2025-04-24 RX ORDER — ACETAMINOPHEN 500 MG/5ML
650 LIQUID (ML) ORAL EVERY 6 HOURS
Refills: 0 | Status: DISCONTINUED | OUTPATIENT
Start: 2025-04-24 | End: 2025-04-28

## 2025-04-24 RX ORDER — VANCOMYCIN HCL IN 5 % DEXTROSE 1.5G/250ML
1000 PLASTIC BAG, INJECTION (ML) INTRAVENOUS ONCE
Refills: 0 | Status: COMPLETED | OUTPATIENT
Start: 2025-04-24 | End: 2025-04-24

## 2025-04-24 RX ORDER — GLUCAGON 3 MG/1
1 POWDER NASAL ONCE
Refills: 0 | Status: DISCONTINUED | OUTPATIENT
Start: 2025-04-24 | End: 2025-04-28

## 2025-04-24 RX ADMIN — Medication 1000 MILLILITER(S): at 12:39

## 2025-04-24 RX ADMIN — Medication 250 MILLIGRAM(S): at 18:41

## 2025-04-24 RX ADMIN — Medication 200 GRAM(S): at 15:58

## 2025-04-24 RX ADMIN — Medication 1000 MILLILITER(S): at 13:39

## 2025-04-24 RX ADMIN — Medication 100 MILLIGRAM(S): at 12:39

## 2025-04-24 RX ADMIN — MEROPENEM 100 MILLIGRAM(S): 1 INJECTION INTRAVENOUS at 18:30

## 2025-04-24 RX ADMIN — LOSARTAN POTASSIUM 25 MILLIGRAM(S): 100 TABLET, FILM COATED ORAL at 18:51

## 2025-04-24 NOTE — PHARMACOTHERAPY INTERVENTION NOTE - NSPHARMCOMMASP
ASP - Lab/ test recommended
ASP - Dose optimization/Non-Renal dose adjustment
ASP - Lab/ test recommended
ASP - Therapy recommended/ Alternative therapy

## 2025-04-24 NOTE — CONSULT NOTE ADULT - SUBJECTIVE AND OBJECTIVE BOX
HAND SURGERY CONSULT NOTE    Patient: CASSIDY ESCALERA , 64y ([birth year])Female   MRN: [id number]  Location: [facility] ED  Visit: 04-24-25 Emergency  Date: 04-24-25 @ 16:16    HPI:  The patient is a 64 year old female with no PMH presenting with 4 days of worsening left hand swelling. She says she was gardening on sunday and using her left hand a lot and the pain began that evening and has gotten worse. she denies any trauma to the hand including any puncture wounds or cuts. She went to her PCP on Monday and received a course of PO antibiotics (she doesn't remember the name of the antibiotic) and since then it has only gotten worse. she denies any fevers or chills, denies any numbness in the hand. She says she is having trouble moving her hand at the wrist and flexing/extending her fingers. Her daughter brings her to the ED for evaluation where she is afebrile, hemodynamically stable. Labs notable for WBC 9.4k, CRP elevated at 66, ESR 87. Xrays of the hand show soft tissue swelling without evidence of foreign body. Hand surgery consulted for evaluation.     PAST MEDICAL & SURGICAL HISTORY:  No pertinent past medical history          Home Medications:  none      VITALS:  T(F): 98.2 (04-24-25 @ 10:20), Max: 98.2 (04-24-25 @ 10:20)  HR: 114 (04-24-25 @ 10:20) (114 - 114)  BP: 166/90 (04-24-25 @ 13:45) (135/83 - 166/90)  RR: 18 (04-24-25 @ 10:20) (18 - 18)  SpO2: 100% (04-24-25 @ 10:20) (100% - 100%)    PHYSICAL EXAM:    General: well appearing, no acute distress  HEENT: pupils equal and reactive, EOM intact, mucous membranes moist, no scleral icterus  CV: RRR on radial exam  Pulm: breathing well on room air, no acute respiratory distress  Abdomen: soft, nontender, no rebound or guarding  Ext: well perfused. There is erythema to the dorsal aspect  of the left hand/forearm and mild circumferential digital swelling. Her fingers are held in slight flexion, there is pain with passive flexion and extension. There is a small area of bruising to the volar aspect of the left thumb and a small healed abrasion to the distal thumb. She is moderately tender throughout the hand and fingers.   Neuro: alert and oriented x3, no focal sensory/motor deficits    MEDICATIONS  (STANDING):  vancomycin  IVPB. 1000 milliGRAM(s) IV Intermittent once    MEDICATIONS  (PRN):      LAB/STUDIES:                        12.7   9.39  )-----------( 260      ( 24 Apr 2025 13:05 )             38.7     04-24    136  |  100  |  13  ----------------------------<  116[H]  5.0   |  25  |  0.6[L]    Ca    9.4      24 Apr 2025 13:05    TPro  7.7  /  Alb  4.1  /  TBili  0.6  /  DBili  x   /  AST  20  /  ALT  23  /  AlkPhos  105  04-24    PT/INR - ( 24 Apr 2025 13:05 )   PT: 11.10 sec;   INR: 0.94 ratio         PTT - ( 24 Apr 2025 13:05 )  PTT:31.5 sec  LIVER FUNCTIONS - ( 24 Apr 2025 13:05 )  Alb: 4.1 g/dL / Pro: 7.7 g/dL / ALK PHOS: 105 U/L / ALT: 23 U/L / AST: 20 U/L / GGT: x           Urinalysis Basic - ( 24 Apr 2025 13:05 )    Color: x / Appearance: x / SG: x / pH: x  Gluc: 116 mg/dL / Ketone: x  / Bili: x / Urobili: x   Blood: x / Protein: x / Nitrite: x   Leuk Esterase: x / RBC: x / WBC x   Sq Epi: x / Non Sq Epi: x / Bacteria: x                  IMAGING:  < from: Xray Forearm, Right (04.24.25 @ 12:46) >    Impression:    Dorsal soft tissue swelling without evidence of osseous erosion or soft   tissue gas. No radiopaque foreign body.    --- End of Report ---        < end of copied text >      ACCESS DEVICES:  [x ] Peripheral IV  [ ] Central Venous Line	[ ] R	[ ] L	[ ] IJ	[ ] Fem	[ ] SC	Placed:   [ ] Arterial Line		[ ] R	[ ] L	[ ] Fem	[ ] Rad	[ ] Ax	Placed:   [ ] PICC:					[ ] Mediport  [ ] Urinary Catheter, Date Placed:     ASSESSMENT:  64yF w/ no PMH presenting with 4 days of worsening left hand pain and swelling after gardening on Sunday. Labs show elevation of inflammatory markers CRP and ESR with normal WBC. Xrays show soft tissue swelling. Patient likely with worsening cellulitis in left hand and fingers    PLAN:  - recommend medical admission for IV abx  - Zosyn and Vancomycin for now, recommend Infectious Disease consult for recommendations  - CT L hand for evaluation  - Keep LUE in Posey block to elevate and reduce swelling    Discussed with Hand Surgery attending, Dr. Cooper Ramirez MD  PGY2 Hand Surgery    CONSULT SPECTRA: [id number]   HAND SURGERY CONSULT NOTE    Patient: CASSIDY ESCALERA , 64y ([birth year])Female   MRN: [id number]  Location: [facility] ED  Visit: 04-24-25 Emergency  Date: 04-24-25 @ 16:16    HPI:  The patient is a 64 year old female with no PMH presenting with 4 days of worsening righthand swelling. She says she was gardening on sunday and using her right hand a lot and the pain began that evening and has gotten worse. she denies any trauma to the hand including any puncture wounds or cuts. She went to her PCP on Monday and received a course of PO antibiotics (she doesn't remember the name of the antibiotic) and since then it has only gotten worse. she denies any fevers or chills, denies any numbness in the hand. She says she is having trouble moving her hand at the wrist and flexing/extending her fingers. Her daughter brings her to the ED for evaluation where she is afebrile, hemodynamically stable. Labs notable for WBC 9.4k, CRP elevated at 66, ESR 87. Xrays of the right hand show soft tissue swelling without evidence of foreign body. Hand surgery consulted for evaluation.     PAST MEDICAL & SURGICAL HISTORY:  No pertinent past medical history          Home Medications:  none      VITALS:  T(F): 98.2 (04-24-25 @ 10:20), Max: 98.2 (04-24-25 @ 10:20)  HR: 114 (04-24-25 @ 10:20) (114 - 114)  BP: 166/90 (04-24-25 @ 13:45) (135/83 - 166/90)  RR: 18 (04-24-25 @ 10:20) (18 - 18)  SpO2: 100% (04-24-25 @ 10:20) (100% - 100%)    PHYSICAL EXAM:    General: well appearing, no acute distress  HEENT: pupils equal and reactive, EOM intact, mucous membranes moist, no scleral icterus  CV: RRR on radial exam  Pulm: breathing well on room air, no acute respiratory distress  Abdomen: soft, nontender, no rebound or guarding  Ext: well perfused. There is erythema to the dorsal aspect  of the righthand/forearm and mild circumferential digital swelling. Her fingers are held in slight flexion, there is pain with passive flexion and extension. There is a small area of bruising to the volar aspect of the right thumb and a small healed abrasion to the distal thumb. She is moderately tender throughout the hand and fingers.   Neuro: alert and oriented x3, no focal sensory/motor deficits    MEDICATIONS  (STANDING):  vancomycin  IVPB. 1000 milliGRAM(s) IV Intermittent once    MEDICATIONS  (PRN):      LAB/STUDIES:                        12.7   9.39  )-----------( 260      ( 24 Apr 2025 13:05 )             38.7     04-24    136  |  100  |  13  ----------------------------<  116[H]  5.0   |  25  |  0.6[L]    Ca    9.4      24 Apr 2025 13:05    TPro  7.7  /  Alb  4.1  /  TBili  0.6  /  DBili  x   /  AST  20  /  ALT  23  /  AlkPhos  105  04-24    PT/INR - ( 24 Apr 2025 13:05 )   PT: 11.10 sec;   INR: 0.94 ratio         PTT - ( 24 Apr 2025 13:05 )  PTT:31.5 sec  LIVER FUNCTIONS - ( 24 Apr 2025 13:05 )  Alb: 4.1 g/dL / Pro: 7.7 g/dL / ALK PHOS: 105 U/L / ALT: 23 U/L / AST: 20 U/L / GGT: x           Urinalysis Basic - ( 24 Apr 2025 13:05 )    Color: x / Appearance: x / SG: x / pH: x  Gluc: 116 mg/dL / Ketone: x  / Bili: x / Urobili: x   Blood: x / Protein: x / Nitrite: x   Leuk Esterase: x / RBC: x / WBC x   Sq Epi: x / Non Sq Epi: x / Bacteria: x                  IMAGING:  < from: Xray Forearm, Right (04.24.25 @ 12:46) >    Impression:    Dorsal soft tissue swelling without evidence of osseous erosion or soft   tissue gas. No radiopaque foreign body.    --- End of Report ---        < end of copied text >      ACCESS DEVICES:  [x ] Peripheral IV  [ ] Central Venous Line	[ ] R	[ ] L	[ ] IJ	[ ] Fem	[ ] SC	Placed:   [ ] Arterial Line		[ ] R	[ ] L	[ ] Fem	[ ] Rad	[ ] Ax	Placed:   [ ] PICC:					[ ] Mediport  [ ] Urinary Catheter, Date Placed:     ASSESSMENT:  64yF w/ no PMH presenting with 4 days of worsening right hand pain and swelling after gardening on Sunday. Labs show elevation of inflammatory markers CRP and ESR with normal WBC. Xrays show soft tissue swelling. Patient likely with worsening cellulitis in right hand and fingers    PLAN:  - recommend medical admission for IV abx  - Zosyn and Vancomycin for now, recommend Infectious Disease consult for recommendations  - CT L hand for evaluation  - Keep RUE in Posey block to elevate and reduce swelling    Discussed with Hand Surgery attending, Dr. Cooper Ramirez MD  PGY2 Hand Surgery    CONSULT SPECTRA: [id number]

## 2025-04-24 NOTE — ED PROVIDER NOTE - ATTENDING CONTRIBUTION TO CARE
65 y/o female h/o HLD was gardening outside and cut her rt hand several days ago, subsequently developed right hand diffuse pain and swelling, denies fever, tactile temp, chills, paresthesias, focal weakness, or other associated complaints at present. Pt denies recent heavy lifting or trauma. Old chart reviewed. I have reviewed and agree with the initial nursing note, except as documented in my note.    VSS, awake, alert, non-toxic appearing, oropharynx clear, mmm, no skin rash or lesions, chest CTAB, non-labored breathing, no w/r/r, +S1/S2, RRR, no m/r/g, abdomen soft, NT, ND, +BS, RUE; no shoulder, elbow, wrist tenderness, hand; diffusely swollen and erythematous, + warmth, pain on extension of digits #3-5, no gross deformity, crepitus, induration, fluctuance, lymphangitis or purulence, compartments non-tense, sensory and motor function to radial, median and ulnar nerve intact. NV intact, capillary refill <3 seconds, 2+ radial pulses, clear speech, steady gait.

## 2025-04-24 NOTE — ED PROVIDER NOTE - CLINICAL SUMMARY MEDICAL DECISION MAKING FREE TEXT BOX
Statement Selected Independent interpretation of the X-Ray film(s) performed by MD Mayo    This patient presents with initial presentation of local erythema, warmth, swelling concerning for cellulitis. Sensitivity/pain to light touch around the erythematous area. No lymphangitic spread visible and no fluid pockets or fluctuance concerning for abscess noted. Low concern for osteomyelitis or DVT. No immune compromise, bullae, pain out of proportion, or rapid progression concerning for necrotizing fasciitis. Plan to admit for further evaluation and management.

## 2025-04-24 NOTE — H&P ADULT - ATTENDING COMMENTS
Patient seen and examined at bedside independently of the residents. I read the resident's note and agree with the plan with the additions and corrections as noted below. My note supersedes the resident's note.     REVIEW OF SYSTEMS:  Negative except in HPI.     PMH: HTN, HLD and DM II?    FHx: Reviewed. No fhx of asthma/copd, No fhx of liver and pulmonary disease. No fhx of hematological disorder.     Physical Exam:  GEN: No acute distress. Awake, Alert and oriented x 3.   Head: Atraumatic, Normocephalic.   Eye: PEERLA. No sclera icterus. EOMI.   ENT: Normal oropharynx, no thyromegaly, no mass, no lymphadenopathy.   LUNGS: Clear to auscultation bilaterally. No wheeze/rales/crackles.   HEART: Normal. S1/S2 present. RRR. No murmur/gallops.   ABD: Soft, non-tender, non-distended. Bowel sounds present.   EXT: No pitting edema b/l LE. No erythema. Right hand swelling and tenderness with limited ROM. + warm to touch.   Integumentary: No rash, No sore, No petechia.   NEURO: CN III-XII intact. Strength: 5/5 b/l ULE. Sensory intact b/l ULE.     Vital Signs Last 24 Hrs  T(C): 36.9 (2025 20:26), Max: 36.9 (2025 20:26)  T(F): 98.5 (2025 20:26), Max: 98.5 (2025 20:26)  HR: 80 (2025 20:26) (76 - 114)  BP: 157/89 (2025 20:26) (135/83 - 176/83)  BP(mean): 114 (2025 18:45) (114 - 114)  RR: 18 (2025 20:26) (18 - 18)  SpO2: 98% (2025 20:26) (98% - 100%)    Parameters below as of 2025 20:26  Patient On (Oxygen Delivery Method): room air      Please see the above notes for Labs and radiology.     Assessment and Plan:     65 yo F with hx of HTN, HLD and DM II? presents to ED for right hand swelling and pain for the past 4 days.     Right hand cellulitis    - X-ray shows Dorsal soft tissue swelling without evidence of osseous erosion or soft tissue gas. No radiopaque foreign body.  - s/p eval by Hand surgery in ED.   - follow up CT hand   - Britni De La Vega for now.  - check BCx, procalcitonin, ESR, CRP   - NPO after MN for possible OR in AM.   - ID consult.   - Follow up Hand surgery.     HTN/HLD - c/w home med.     DVT ppx: Lovenox SC  GI ppx: not indicated.   Diet: DASH diet, NPO after MN.  Activity: as tolerated.     Date seen by the attendin2025  I have spent 75 minutes of time on the encounter which excludes teaching and/or separately reported services.

## 2025-04-24 NOTE — PHARMACOTHERAPY INTERVENTION NOTE - COMMENTS
Adjusted vancomycin level for 4/26 @16:00 to 11:00 to allow ample time to review level prior to 4/26 evening dose.     Talon Rowe, PharmD, [facility]  Clinical Pharmacy Specialist, Infectious Diseases  Tele-Antimicrobial Stewardship Program (Tele-ASP)  Tele-ASP Phone: [phone] 
Recommended vancomycin 1250mg IV q24h, ordered empirically for suspected SSTI, which is predicted to result in a future steady-state vancomycin AUC/KURTIS of 480mg*hr/mL.    Talon Rowe, PharmD, [facility]  Clinical Pharmacy Specialist, Infectious Diseases  Tele-Antimicrobial Stewardship Program (Tele-ASP)  Tele-ASP Phone: [phone] 
As per policy, ordered a vancomycin level for 4/26 @16:00 to assist with further dosing optimization.    Talon Rowe, PharmD, BCIDP  Clinical Pharmacy Specialist, Infectious Diseases  Tele-Antimicrobial Stewardship Program (Tele-ASP)  Tele-ASP Phone: [phone] 
Modified patient height [facility] to assist with dosing optimization of vancomycin.    Talon Rowe, PharmD, [facility]  Clinical Pharmacy Specialist, Infectious Diseases  Tele-Antimicrobial Stewardship Program (Tele-ASP)  Tele-ASP Phone: [phone]

## 2025-04-24 NOTE — H&P ADULT - HISTORY OF PRESENT ILLNESS
65 y/o female h/o HTN was gardening outside and cut her right hand several days ago, subsequently developed right hand diffuse pain and swelling, denies fever, tactile temp, chills, paresthesias, focal weakness, or other associated complaints at present. Pt denies recent heavy lifting or trauma.    In the ED vitals:  · BP Systolic	135 mm Hg  · BP Diastolic	83 mm Hg  · Heart Rate	 114 /min  · Respiration Rate (breaths/min)	18 /min  · Temp (F)	98.2 Degrees F  · Temp (C)	36.8 Degrees C  · Temp site	oral  · SpO2 (%)	100 %  · O2 Delivery/Oxygen Delivery Method	room air  · Temp at ED Arrival (C)	36.8 Degrees C    -Labs showed ESR in 80s, CRP 60s, wbc normal, other labs unremarkable  -xray hand: Dorsal soft tissue swelling without evidence of osseous erosion or soft                          tissue gas. No radiopaque foreign body.    Surgery evaluation: recommended abx, ID cx, CT right hand, Posey block     65 y/o female h/o HTN, DL, ? DM was gardening outside and cut her right hand several days ago, subsequently developed right hand diffuse pain and swelling, denies fever, tactile temp, chills, paresthesias, focal weakness, or other associated complaints at present. Pt denies recent heavy lifting or trauma.    In the ED vitals:  · BP Systolic	135 mm Hg  · BP Diastolic	83 mm Hg  · Heart Rate	 114 /min  · Respiration Rate (breaths/min)	18 /min  · Temp (F)	98.2 Degrees F  · Temp (C)	36.8 Degrees C  · Temp site	oral  · SpO2 (%)	100 %  · O2 Delivery/Oxygen Delivery Method	room air  · Temp at ED Arrival (C)	36.8 Degrees C    -Labs showed ESR in 80s, CRP 60s, wbc normal, other labs unremarkable  -xray hand: Dorsal soft tissue swelling without evidence of osseous erosion or soft                          tissue gas. No radiopaque foreign body.    Surgery evaluation: recommended abx, ID cx, CT right hand, Posey block

## 2025-04-24 NOTE — CHART NOTE - NSCHARTNOTEFT_GEN_A_CORE
64 year old female with no significant PMH presenting to the ED with chief complaint of 4 days of worsening right hand swelling. CT scan of Right hand performed, findings below.     IMPRESSION:  1.  Common flexor tendon sheath edema and inflammatory change, most   consistent with tenosynovitis, which may be pyogenic in etiology.   Recommend surgical consultation.    2.  Multiple small joint space effusions with thickened/enhancing   synovium, which may represent reactive synovitis.    Pt evaluated at bedside AAOX3, NAD reporting hand pain that is not worsening. On exam: Right hand is uniform and diffusely swollen up to the wrist with erythema and warmth to touch. Hand is noted to be in passive flexion at rest with decreased ROM, but sensation is intact with palpable radial and ulnar pulses. Pain with passive and active extension, more significantly on 3rd-5th digit. Tenderness to palpation of wrist and sheath of 3rd-5th digits.     PLAN:  -NPO at MN  -IVFs while NPO  -IV Abx's, on Meropenem and Vancomycin  -Right Upper extremity elevation at all time  -Pain control  -Pre-op labs  -Pending final CT read  -Will continue to closely monitor, if no improvement with IV Abx will require surgical intervention    #[id number] Mandarin     Spectra [id number]

## 2025-04-24 NOTE — ED PROVIDER NOTE - DIFFERENTIAL DIAGNOSIS
see mdm  Independent interpretation of the X-Ray film(s) performed by MD Mayo Differential Diagnosis see mdm  Independent interpretation of the X-Ray film(s) performed by MD Mayo    This patient presents with initial presentation of local erythema, warmth, swelling concerning for cellulitis. Sensitivity/pain to light touch around the erythematous area. No lymphangitic spread visible and no fluid pockets or fluctuance concerning for abscess noted. Low concern for osteomyelitis or DVT. No immune compromise, bullae, pain out of proportion, or rapid progression concerning for necrotizing fasciitis. Plan to admit for further evaluation and management. see mdm

## 2025-04-24 NOTE — ED PROVIDER NOTE - TOBACCO USE
Pt was brought to ED by EMS from [facility] to eval AMS. Per EMS, pt was found unresponsive by rn this AM. Pt was seen in ED 2 days ago for \"smth related to pressure ulcer\". No further info reported.      
Unknown if ever smoked

## 2025-04-24 NOTE — H&P ADULT - NSHPPHYSICALEXAM_GEN_ALL_CORE
PHYSICAL EXAM:  GENERAL: NAD, speaks in full sentences, no signs of respiratory distress  PSYCH: AAOx3  CHEST/LUNG: Clear to auscultation bilaterally; No wheeze; No crackles; No accessory muscles used  HEART: Regular rate and rhythm; No murmurs;   ABDOMEN: Soft, Nontender, Nondistended; Bowel sounds present; No guarding  EXTREMITIES: erythematous right hand, edematous, pain on flexion  NEUROLOGY: non-focal

## 2025-04-24 NOTE — ED PROCEDURE NOTE - PROCEDURE ADDITIONAL DETAILS
Ring removed from R 4th digit using Raptor scissors. No complications, NVI prior and post removal. Diffuse swelling of hand was indication for removal.

## 2025-04-24 NOTE — ED PROVIDER NOTE - NSCAREINITIATED _GEN_ER
Lab Result Notifications    Â· If labs were ordered at your appointment today, we will contact you with results once all your labs have resulted. Please note certain hormone labs can take up to 10 business days to result. Prescription Policy     Our goal is to serve you on a more timely basis. Currently, our office receives a large volume of phone requests for medication refills. We are requesting your cooperation with the following:    Â· Look over your medications, diabetic supplies, etc. before coming to your appointment to see if you need any refills. Â· Request your medication (or supply) refills during your office visit. Â· Outside of an office visit, requests should be directed to your pharmacy even if you have zero refills. Call your pharmacy after 72 hours to see if your prescription is ready for pick-up. Pharmacy Refills: All prescriptions take 48-72 hours to refill. Clinic Suite Number Change    In August 2018, our suite number changed to [address],  [address]. Our location is unchanged. Our Patients are Important    We want to improve and you can help. You may receive a survey asking you about this visit. Please complete this survey; we will use your feedback to make improvements. Thank you.
Patient awaiting lab results and aware with plan of care, will continue to monitor.
Horacio Wilson(Resident)

## 2025-04-24 NOTE — H&P ADULT - ASSESSMENT
65 y/o female h/o HTN was gardening outside and cut her right hand several days ago, subsequently developed right hand diffuse pain and swelling. Admitted for hand cellulitis.    #Hand cellulitis  -patient was gardening and cut herself, few days later became painful and swollen  -no sepsis on admission, vitals stable, hand movement is limited by pain and edema  -Labs showed ESR in 80s, CRP 60s, wbc normal, other labs unremarkable  -xray hand: Dorsal soft tissue swelling without evidence of osseous erosion or soft                          tissue gas. No radiopaque foreign body.  -Sx consulted; recommended IV abx, ID cx, CT hand, Posey block  Plan:  -f/u CT hand and surgery reccs  -start on Vancomycin and Meropenem  -f/u ID         63 y/o female h/o HTN, DL, ? DM was gardening outside and cut her right hand several days ago, subsequently developed right hand diffuse pain and swelling. Admitted for hand cellulitis.    #Hand cellulitis  -patient was gardening and cut herself, few days later became painful and swollen  -no sepsis on admission, vitals stable, hand movement is limited by pain and edema  -Labs showed ESR in 80s, CRP 60s, wbc normal, other labs unremarkable  -xray hand: Dorsal soft tissue swelling without evidence of osseous erosion or soft                          tissue gas. No radiopaque foreign body.  -Sx consulted; recommended IV abx, ID cx, CT hand, Posey block  Plan:  -f/u CT hand and surgery reccs  -start on Vancomycin and Meropenem  -f/u ID    #DL  #HTN  #?DM  -c/w Losartan  -c/w statin and ezetimibe   -insulin sliding sliding, check a1c  -c/w gabapentin    MISC  DVT ppx: Lovenox  GI ppx: none  Diet: DASH  Activity: IAT

## 2025-04-24 NOTE — ED PROVIDER NOTE - PROGRESS NOTE DETAILS
Horacio Wilson DO: IV antibiotics started, hand surgery consulted, x-rays performed.  Patient has no white count, x-rays negative for osteo or gas.  Further recommendations and disposition pending hand surgery. Horacio Wilson DO: Hand surgery recommendations are admission for IV antibiotics, 1 dose vancomycin and Zosyn given here.  They also want dedicated CT of the right hand.  This will be followed up and patient by the MAR.  Results communicated to the patient, they are understanding, admitted to medicine.

## 2025-04-24 NOTE — H&P ADULT - NSHPLABSRESULTS_GEN_ALL_CORE
.  LABS:                         12.7   9.39  )-----------( 260      ( 24 Apr 2025 13:05 )             38.7     04-24    136  |  100  |  13  ----------------------------<  116[H]  5.0   |  25  |  0.6[L]    Ca    9.4      24 Apr 2025 13:05    TPro  7.7  /  Alb  4.1  /  TBili  0.6  /  DBili  x   /  AST  20  /  ALT  23  /  AlkPhos  105  04-24    PT/INR - ( 24 Apr 2025 13:05 )   PT: 11.10 sec;   INR: 0.94 ratio         PTT - ( 24 Apr 2025 13:05 )  PTT:31.5 sec  Urinalysis Basic - ( 24 Apr 2025 13:05 )    Color: x / Appearance: x / SG: x / pH: x  Gluc: 116 mg/dL / Ketone: x  / Bili: x / Urobili: x   Blood: x / Protein: x / Nitrite: x   Leuk Esterase: x / RBC: x / WBC x   Sq Epi: x / Non Sq Epi: x / Bacteria: x        Lactate, Blood: 1.1 mmol/L (04-24 @ 13:05)      RADIOLOGY, EKG & ADDITIONAL TESTS: Reviewed.

## 2025-04-24 NOTE — ED PROVIDER NOTE - OBJECTIVE STATEMENT
64-year-old female with PMH of HLD presents for right hand swelling.  Onset 5 days ago.  Patient was working in the garden using scissors to cut a branch when she felt pain in her hand after trying to cut.  No acute injury.  Patient went to her PCP that day.  He prescribed anti-inflammatories.  Patient did not improve and noticed increased swelling with pain.  Patient went to PCP again on 4/22 for reevaluation, he said to just give it some time.  Patient woke up this morning was unable to move her fingers secondary to pain.  Swelling has increased, it is red, it is warm.  She notices that this more feels also traveling approximately in her forearm. Denies fever, headache, cough, chest pain, SOB, abdominal pain, N/V/D, dysuria.

## 2025-04-25 LAB
A1C WITH ESTIMATED AVERAGE GLUCOSE RESULT: 6.8 % — HIGH (ref 4–5.6)
ALBUMIN SERPL ELPH-MCNC: 3.5 G/DL — SIGNIFICANT CHANGE UP (ref 3.5–5.2)
ALP SERPL-CCNC: 100 U/L — SIGNIFICANT CHANGE UP (ref 30–115)
ALT FLD-CCNC: 18 U/L — SIGNIFICANT CHANGE UP (ref 0–41)
ANION GAP SERPL CALC-SCNC: 12 MMOL/L — SIGNIFICANT CHANGE UP (ref 7–14)
AST SERPL-CCNC: 15 U/L — SIGNIFICANT CHANGE UP (ref 0–41)
BASOPHILS # BLD AUTO: 0.02 K/UL — SIGNIFICANT CHANGE UP (ref 0–0.2)
BASOPHILS # BLD AUTO: 0.03 K/UL — SIGNIFICANT CHANGE UP (ref 0–0.2)
BASOPHILS NFR BLD AUTO: 0.2 % — SIGNIFICANT CHANGE UP (ref 0–1)
BASOPHILS NFR BLD AUTO: 0.4 % — SIGNIFICANT CHANGE UP (ref 0–1)
BILIRUB SERPL-MCNC: 0.9 MG/DL — SIGNIFICANT CHANGE UP (ref 0.2–1.2)
BLD GP AB SCN SERPL QL: SIGNIFICANT CHANGE UP
BUN SERPL-MCNC: 8 MG/DL — LOW (ref 10–20)
CALCIUM SERPL-MCNC: 8.9 MG/DL — SIGNIFICANT CHANGE UP (ref 8.4–10.5)
CHLORIDE SERPL-SCNC: 101 MMOL/L — SIGNIFICANT CHANGE UP (ref 98–110)
CO2 SERPL-SCNC: 23 MMOL/L — SIGNIFICANT CHANGE UP (ref 17–32)
CREAT SERPL-MCNC: 0.5 MG/DL — LOW (ref 0.7–1.5)
CRP SERPL-MCNC: 49.8 MG/L — HIGH
CRP SERPL-MCNC: 50.3 MG/L — HIGH
EGFR: 103 ML/MIN/1.73M2 — SIGNIFICANT CHANGE UP
EGFR: 103 ML/MIN/1.73M2 — SIGNIFICANT CHANGE UP
EOSINOPHIL # BLD AUTO: 0.05 K/UL — SIGNIFICANT CHANGE UP (ref 0–0.7)
EOSINOPHIL # BLD AUTO: 0.08 K/UL — SIGNIFICANT CHANGE UP (ref 0–0.7)
EOSINOPHIL NFR BLD AUTO: 0.6 % — SIGNIFICANT CHANGE UP (ref 0–8)
EOSINOPHIL NFR BLD AUTO: 0.9 % — SIGNIFICANT CHANGE UP (ref 0–8)
ERYTHROCYTE [SEDIMENTATION RATE] IN BLOOD: 76 MM/HR — HIGH (ref 0–20)
ESTIMATED AVERAGE GLUCOSE: 148 MG/DL — HIGH (ref 68–114)
GLUCOSE BLDC GLUCOMTR-MCNC: 113 MG/DL — HIGH (ref 70–99)
GLUCOSE BLDC GLUCOMTR-MCNC: 129 MG/DL — HIGH (ref 70–99)
GLUCOSE BLDC GLUCOMTR-MCNC: 131 MG/DL — HIGH (ref 70–99)
GLUCOSE BLDC GLUCOMTR-MCNC: 171 MG/DL — HIGH (ref 70–99)
GLUCOSE SERPL-MCNC: 137 MG/DL — HIGH (ref 70–99)
HCT VFR BLD CALC: 36.2 % — LOW (ref 37–47)
HCT VFR BLD CALC: 36.5 % — LOW (ref 37–47)
HGB BLD-MCNC: 12.3 G/DL — SIGNIFICANT CHANGE UP (ref 12–16)
HGB BLD-MCNC: 12.3 G/DL — SIGNIFICANT CHANGE UP (ref 12–16)
IMM GRANULOCYTES NFR BLD AUTO: 0.5 % — HIGH (ref 0.1–0.3)
IMM GRANULOCYTES NFR BLD AUTO: 0.6 % — HIGH (ref 0.1–0.3)
INR BLD: 1.02 RATIO — SIGNIFICANT CHANGE UP (ref 0.65–1.3)
LYMPHOCYTES # BLD AUTO: 1.29 K/UL — SIGNIFICANT CHANGE UP (ref 1.2–3.4)
LYMPHOCYTES # BLD AUTO: 1.31 K/UL — SIGNIFICANT CHANGE UP (ref 1.2–3.4)
LYMPHOCYTES # BLD AUTO: 15 % — LOW (ref 20.5–51.1)
LYMPHOCYTES # BLD AUTO: 15.3 % — LOW (ref 20.5–51.1)
MAGNESIUM SERPL-MCNC: 1.9 MG/DL — SIGNIFICANT CHANGE UP (ref 1.8–2.4)
MCHC RBC-ENTMCNC: 30.1 PG — SIGNIFICANT CHANGE UP (ref 27–31)
MCHC RBC-ENTMCNC: 30.2 PG — SIGNIFICANT CHANGE UP (ref 27–31)
MCHC RBC-ENTMCNC: 33.7 G/DL — SIGNIFICANT CHANGE UP (ref 32–37)
MCHC RBC-ENTMCNC: 34 G/DL — SIGNIFICANT CHANGE UP (ref 32–37)
MCV RBC AUTO: 88.7 FL — SIGNIFICANT CHANGE UP (ref 81–99)
MCV RBC AUTO: 89.7 FL — SIGNIFICANT CHANGE UP (ref 81–99)
MONOCYTES # BLD AUTO: 0.76 K/UL — HIGH (ref 0.1–0.6)
MONOCYTES # BLD AUTO: 0.95 K/UL — HIGH (ref 0.1–0.6)
MONOCYTES NFR BLD AUTO: 11.1 % — HIGH (ref 1.7–9.3)
MONOCYTES NFR BLD AUTO: 8.8 % — SIGNIFICANT CHANGE UP (ref 1.7–9.3)
NEUTROPHILS # BLD AUTO: 6.13 K/UL — SIGNIFICANT CHANGE UP (ref 1.4–6.5)
NEUTROPHILS # BLD AUTO: 6.44 K/UL — SIGNIFICANT CHANGE UP (ref 1.4–6.5)
NEUTROPHILS NFR BLD AUTO: 71.7 % — SIGNIFICANT CHANGE UP (ref 42.2–75.2)
NEUTROPHILS NFR BLD AUTO: 74.9 % — SIGNIFICANT CHANGE UP (ref 42.2–75.2)
NRBC BLD AUTO-RTO: 0 /100 WBCS — SIGNIFICANT CHANGE UP (ref 0–0)
NRBC BLD AUTO-RTO: 0 /100 WBCS — SIGNIFICANT CHANGE UP (ref 0–0)
PLATELET # BLD AUTO: 255 K/UL — SIGNIFICANT CHANGE UP (ref 130–400)
PLATELET # BLD AUTO: 263 K/UL — SIGNIFICANT CHANGE UP (ref 130–400)
PMV BLD: 9.5 FL — SIGNIFICANT CHANGE UP (ref 7.4–10.4)
PMV BLD: 9.7 FL — SIGNIFICANT CHANGE UP (ref 7.4–10.4)
POTASSIUM SERPL-MCNC: 4.4 MMOL/L — SIGNIFICANT CHANGE UP (ref 3.5–5)
POTASSIUM SERPL-SCNC: 4.4 MMOL/L — SIGNIFICANT CHANGE UP (ref 3.5–5)
PROCALCITONIN SERPL-MCNC: 0.09 NG/ML — SIGNIFICANT CHANGE UP (ref 0.02–0.1)
PROT SERPL-MCNC: 6.8 G/DL — SIGNIFICANT CHANGE UP (ref 6–8)
PROTHROM AB SERPL-ACNC: 12.1 SEC — SIGNIFICANT CHANGE UP (ref 9.95–12.87)
RBC # BLD: 4.07 M/UL — LOW (ref 4.2–5.4)
RBC # BLD: 4.08 M/UL — LOW (ref 4.2–5.4)
RBC # FLD: 14.5 % — SIGNIFICANT CHANGE UP (ref 11.5–14.5)
RBC # FLD: 14.6 % — HIGH (ref 11.5–14.5)
SODIUM SERPL-SCNC: 136 MMOL/L — SIGNIFICANT CHANGE UP (ref 135–146)
WBC # BLD: 8.55 K/UL — SIGNIFICANT CHANGE UP (ref 4.8–10.8)
WBC # BLD: 8.6 K/UL — SIGNIFICANT CHANGE UP (ref 4.8–10.8)
WBC # FLD AUTO: 8.55 K/UL — SIGNIFICANT CHANGE UP (ref 4.8–10.8)
WBC # FLD AUTO: 8.6 K/UL — SIGNIFICANT CHANGE UP (ref 4.8–10.8)

## 2025-04-25 PROCEDURE — 99233 SBSQ HOSP IP/OBS HIGH 50: CPT

## 2025-04-25 RX ORDER — AMPICILLIN SODIUM AND SULBACTAM SODIUM 1; .5 G/1; G/1
3 INJECTION, POWDER, FOR SOLUTION INTRAMUSCULAR; INTRAVENOUS EVERY 6 HOURS
Refills: 0 | Status: DISCONTINUED | OUTPATIENT
Start: 2025-04-25 | End: 2025-04-26

## 2025-04-25 RX ORDER — TETANUS AND DIPHTHERIA TOXOIDS ADSORBED 2; 2 [LF]/.5ML; [LF]/.5ML
0.5 INJECTION INTRAMUSCULAR ONCE
Refills: 0 | Status: DISCONTINUED | OUTPATIENT
Start: 2025-04-25 | End: 2025-04-28

## 2025-04-25 RX ORDER — AMPICILLIN SODIUM AND SULBACTAM SODIUM 1; .5 G/1; G/1
INJECTION, POWDER, FOR SOLUTION INTRAMUSCULAR; INTRAVENOUS
Refills: 0 | Status: DISCONTINUED | OUTPATIENT
Start: 2025-04-25 | End: 2025-04-26

## 2025-04-25 RX ORDER — AMPICILLIN SODIUM AND SULBACTAM SODIUM 1; .5 G/1; G/1
3 INJECTION, POWDER, FOR SOLUTION INTRAMUSCULAR; INTRAVENOUS ONCE
Refills: 0 | Status: COMPLETED | OUTPATIENT
Start: 2025-04-25 | End: 2025-04-25

## 2025-04-25 RX ADMIN — SODIUM CHLORIDE 90 MILLILITER(S): 9 INJECTION, SOLUTION INTRAVENOUS at 02:31

## 2025-04-25 RX ADMIN — GABAPENTIN 200 MILLIGRAM(S): 400 CAPSULE ORAL at 12:01

## 2025-04-25 RX ADMIN — ENOXAPARIN SODIUM 40 MILLIGRAM(S): 100 INJECTION SUBCUTANEOUS at 12:01

## 2025-04-25 RX ADMIN — SODIUM CHLORIDE 90 MILLILITER(S): 9 INJECTION, SOLUTION INTRAVENOUS at 07:43

## 2025-04-25 RX ADMIN — AMPICILLIN SODIUM AND SULBACTAM SODIUM 200 GRAM(S): 1; .5 INJECTION, POWDER, FOR SOLUTION INTRAMUSCULAR; INTRAVENOUS at 12:00

## 2025-04-25 RX ADMIN — Medication 25 GRAM(S): at 05:48

## 2025-04-25 RX ADMIN — EZETIMIBE 10 MILLIGRAM(S): 10 TABLET ORAL at 12:01

## 2025-04-25 RX ADMIN — AMPICILLIN SODIUM AND SULBACTAM SODIUM 200 GRAM(S): 1; .5 INJECTION, POWDER, FOR SOLUTION INTRAMUSCULAR; INTRAVENOUS at 17:33

## 2025-04-25 RX ADMIN — AMPICILLIN SODIUM AND SULBACTAM SODIUM 200 GRAM(S): 1; .5 INJECTION, POWDER, FOR SOLUTION INTRAMUSCULAR; INTRAVENOUS at 23:22

## 2025-04-25 RX ADMIN — ROSUVASTATIN CALCIUM 40 MILLIGRAM(S): 20 TABLET, FILM COATED ORAL at 21:15

## 2025-04-25 RX ADMIN — Medication 200 GRAM(S): at 02:31

## 2025-04-25 RX ADMIN — LOSARTAN POTASSIUM 25 MILLIGRAM(S): 100 TABLET, FILM COATED ORAL at 05:16

## 2025-04-25 RX ADMIN — Medication 100 MILLIGRAM(S): at 05:16

## 2025-04-25 RX ADMIN — ROSUVASTATIN CALCIUM 40 MILLIGRAM(S): 20 TABLET, FILM COATED ORAL at 03:18

## 2025-04-25 RX ADMIN — Medication 1 APPLICATION(S): at 17:34

## 2025-04-25 RX ADMIN — Medication 81 MILLIGRAM(S): at 12:01

## 2025-04-25 NOTE — CONSULT NOTE ADULT - TIME BILLING
-My assessment required my independent history taking and time required is independent of the teaching service  -I independently interpreted the most recent imaging ( CXR ) and all available labs ( CBC, CMP) and cultures ( along with the sensitivities / KURTIS )  -Time excludes teaching time  -I reviewed all prior tests and documents  -I discussed my recommendations with the primary team housestaff/Attending  -I assisted with initiation of antibiotics

## 2025-04-25 NOTE — PROGRESS NOTE ADULT - SUBJECTIVE AND OBJECTIVE BOX
Patient is a 64y old  Female who presents with a chief complaint of Hand swelling (25 Apr 2025 11:39)    Patient was seen and examined.  C/o right hand swelling  Denies any other complaints.  All systems reviewed positive history as mentioned above.    Allergies  No Known Allergies    MEDICATIONS  (STANDING):  ampicillin/sulbactam  IVPB 3 Gram(s) IV Intermittent every 6 hours  ampicillin/sulbactam  IVPB      aspirin enteric coated 81 milliGRAM(s) Oral daily  diphtheria/tetanus Vaccine - Adult 0.5 milliLiter(s) IntraMuscular once  enoxaparin Injectable 40 milliGRAM(s) SubCutaneous every 24 hours  ezetimibe 10 milliGRAM(s) Oral daily  gabapentin 200 milliGRAM(s) Oral daily  glucagon  Injectable 1 milliGRAM(s) IntraMuscular once  insulin lispro (ADMELOG) corrective regimen sliding scale   SubCutaneous three times a day before meals  losartan 25 milliGRAM(s) Oral daily  rosuvastatin 40 milliGRAM(s) Oral at bedtime    MEDICATIONS  (PRN):  acetaminophen     Tablet .. 650 milliGRAM(s) Oral every 6 hours PRN Mild Pain (1 - 3)  dextrose Oral Gel 15 Gram(s) Oral once PRN Blood Glucose LESS THAN 70 milliGRAM(s)/deciliter    Vital Signs Last 24 Hrs  T(C): 36.7  T(F): 98  HR: 75  BP: 159/78  BP(mean): 114  RR: 18  SpO2: 98%    O/E:  Awake, alert, not in distress.  HEENT: atraumatic, EOMI.  Chest: clear.  CVS: SIS2 +, no murmur.  P/A: Soft, BS+  CNS: awake, alert  Ext: no edema feet. Right Hand swelling +, dressing+  All systems reviewed positive findings as above.    POCT Blood Glucose.: 131 mg/dL (25 Apr 2025 10:58)  POCT Blood Glucose.: 129 mg/dL (25 Apr 2025 07:17)                          12.3   8.55  )-----------( 263      ( 25 Apr 2025 05:42 )             36.5[L]                        12.7   9.39  )-----------( 260      ( 24 Apr 2025 13:05 )             38.7     04-25    136  |  101  |  8[L]  ----------------------------<  137[H]  4.4   |  23  |  0.5[L]  04-24    136  |  100  |  13  ----------------------------<  116[H]  5.0   |  25  |  0.6[L]    Ca    8.9      25 Apr 2025 05:42  Ca    9.4      24 Apr 2025 13:05  Mg     1.9     04-25    TPro  6.8  /  Alb  3.5  /  TBili  0.9  /  DBili  x   /  AST  15  /  ALT  18  /  AlkPhos  100  04-25  TPro  7.7  /  Alb  4.1  /  TBili  0.6  /  DBili  x   /  AST  20  /  ALT  23  /  AlkPhos  105  04-24          PT/INR - ( 25 Apr 2025 05:42 )   PT: 12.10 sec;   INR: 1.02 ratio         PTT - ( 24 Apr 2025 13:05 )  PTT:31.5 sec  Urinalysis Basic - ( 25 Apr 2025 05:42 )    Color: x / Appearance: x / SG: x / pH: x  Gluc: 137 mg/dL / Ketone: x  / Bili: x / Urobili: x   Blood: x / Protein: x / Nitrite: x   Leuk Esterase: x / RBC: x / WBC x   Sq Epi: x / Non Sq Epi: x / Bacteria: x

## 2025-04-25 NOTE — CONSULT NOTE ADULT - SUBJECTIVE AND OBJECTIVE BOX
NAYANA ESCALERAEVA  64y, Female  Allergy: No Known Allergies      All historical available data reviewed.    HPI:  63 y/o female h/o HTN, DL, ? DM was gardening outside and cut her right hand several days ago, subsequently developed right hand diffuse pain and swelling, denies fever, tactile temp, chills, paresthesias, focal weakness, or other associated complaints at present. Pt denies recent heavy lifting or trauma.    In the ED vitals:  · BP Systolic	135 mm Hg  · BP Diastolic	83 mm Hg  · Heart Rate	 114 /min  · Respiration Rate (breaths/min)	18 /min  · Temp (F)	98.2 Degrees F  · Temp (C)	36.8 Degrees C  · Temp site	oral  · SpO2 (%)	100 %  · O2 Delivery/Oxygen Delivery Method	room air  · Temp at ED Arrival (C)	36.8 Degrees C    -Labs showed ESR in 80s, CRP 60s, wbc normal, other labs unremarkable  -xray hand: Dorsal soft tissue swelling without evidence of osseous erosion or soft                          tissue gas. No radiopaque foreign body.    Surgery evaluation: recommended abx, ID cx, CT right hand, [place] block     (24 Apr 2025 16:42)    FAMILY HISTORY:    PAST MEDICAL & SURGICAL HISTORY:  No pertinent past medical history            VITALS:  T(F): 98, Max: 98.5 (04-24-25 @ 20:26)  HR: 75  BP: 159/78  RR: 18Vital Signs Last 24 Hrs  T(C): 36.7 (25 Apr 2025 08:04), Max: 36.9 (24 Apr 2025 20:26)  T(F): 98 (25 Apr 2025 08:04), Max: 98.5 (24 Apr 2025 20:26)  HR: 75 (25 Apr 2025 08:04) (75 - 114)  BP: 159/78 (25 Apr 2025 08:04) (135/83 - 176/83)  BP(mean): 114 (24 Apr 2025 18:45) (114 - 114)  RR: 18 (25 Apr 2025 08:04) (18 - 18)  SpO2: 98% (25 Apr 2025 08:04) (98% - 100%)    Parameters below as of 25 Apr 2025 08:04  Patient On (Oxygen Delivery Method): room air        TESTS & MEASUREMENTS:                        12.3   8.55  )-----------( 263      ( 25 Apr 2025 05:42 )             36.5     04-25    136  |  101  |  8[L]  ----------------------------<  137[H]  4.4   |  23  |  0.5[L]    Ca    8.9      25 Apr 2025 05:42  Mg     1.9     04-25    TPro  6.8  /  Alb  3.5  /  TBili  0.9  /  DBili  x   /  AST  15  /  ALT  18  /  AlkPhos  100  04-25    LIVER FUNCTIONS - ( 25 Apr 2025 05:42 )  Alb: 3.5 g/dL / Pro: 6.8 g/dL / ALK PHOS: 100 U/L / ALT: 18 U/L / AST: 15 U/L / GGT: x             Urinalysis Basic - ( 25 Apr 2025 05:42 )    Color: x / Appearance: x / SG: x / pH: x  Gluc: 137 mg/dL / Ketone: x  / Bili: x / Urobili: x   Blood: x / Protein: x / Nitrite: x   Leuk Esterase: x / RBC: x / WBC x   Sq Epi: x / Non Sq Epi: x / Bacteria: x          RADIOLOGY & ADDITIONAL TESTS:  Personal review of radiological diagnostics performed  Echo and EKG results noted when applicable.     MEDICATIONS:  acetaminophen     Tablet .. 650 milliGRAM(s) Oral every 6 hours PRN  aspirin enteric coated 81 milliGRAM(s) Oral daily  dextrose 5%. 1000 milliLiter(s) IV Continuous <Continuous>  dextrose 5%. 1000 milliLiter(s) IV Continuous <Continuous>  dextrose 50% Injectable 25 Gram(s) IV Push once  dextrose 50% Injectable 12.5 Gram(s) IV Push once  dextrose 50% Injectable 25 Gram(s) IV Push once  dextrose Oral Gel 15 Gram(s) Oral once PRN  enoxaparin Injectable 40 milliGRAM(s) SubCutaneous every 24 hours  ezetimibe 10 milliGRAM(s) Oral daily  gabapentin 200 milliGRAM(s) Oral daily  glucagon  Injectable 1 milliGRAM(s) IntraMuscular once  insulin lispro (ADMELOG) corrective regimen sliding scale   SubCutaneous three times a day before meals  lactated ringers. 1000 milliLiter(s) IV Continuous <Continuous>  losartan 25 milliGRAM(s) Oral daily  piperacillin/tazobactam IVPB.. 3.375 Gram(s) IV Intermittent every 8 hours  rosuvastatin 40 milliGRAM(s) Oral at bedtime  vancomycin  IVPB 500 milliGRAM(s) IV Intermittent every 12 hours      ANTIBIOTICS:  piperacillin/tazobactam IVPB.. 3.375 Gram(s) IV Intermittent every 8 hours  vancomycin  IVPB 500 milliGRAM(s) IV Intermittent every 12 hours

## 2025-04-25 NOTE — PROGRESS NOTE ADULT - SUBJECTIVE AND OBJECTIVE BOX
SUBJECTIVE/OVERNIGHT EVENTS  Today is hospital day 1d. This morning patient was seen and examined at bedside, resting comfortably in bed. No acute or major events overnight.    CODE STATUS:    FAMILY COMMUNICATION  Contact date:  Name of person contacted:  Relationship to patient:  Communication details:    MEDICATIONS  STANDING MEDICATIONS  ampicillin/sulbactam  IVPB 3 Gram(s) IV Intermittent once  ampicillin/sulbactam  IVPB 3 Gram(s) IV Intermittent every 6 hours  ampicillin/sulbactam  IVPB      aspirin enteric coated 81 milliGRAM(s) Oral daily  chlorhexidine 2% Cloths 1 Application(s) Topical <User Schedule>  dextrose 5%. 1000 milliLiter(s) IV Continuous <Continuous>  dextrose 5%. 1000 milliLiter(s) IV Continuous <Continuous>  dextrose 50% Injectable 25 Gram(s) IV Push once  dextrose 50% Injectable 12.5 Gram(s) IV Push once  dextrose 50% Injectable 25 Gram(s) IV Push once  enoxaparin Injectable 40 milliGRAM(s) SubCutaneous every 24 hours  ezetimibe 10 milliGRAM(s) Oral daily  gabapentin 200 milliGRAM(s) Oral daily  glucagon  Injectable 1 milliGRAM(s) IntraMuscular once  insulin lispro (ADMELOG) corrective regimen sliding scale   SubCutaneous three times a day before meals  lactated ringers. 1000 milliLiter(s) IV Continuous <Continuous>  losartan 25 milliGRAM(s) Oral daily  rosuvastatin 40 milliGRAM(s) Oral at bedtime    PRN MEDICATIONS  acetaminophen     Tablet .. 650 milliGRAM(s) Oral every 6 hours PRN  dextrose Oral Gel 15 Gram(s) Oral once PRN    VITALS  T(F): 98 (04-25-25 @ 08:04), Max: 98.5 (04-24-25 @ 20:26)  HR: 75 (04-25-25 @ 08:04) (75 - 82)  BP: 159/78 (04-25-25 @ 08:04) (137/76 - 176/83)  RR: 18 (04-25-25 @ 08:04) (18 - 18)  SpO2: 98% (04-25-25 @ 08:04) (98% - 98%)  POCT Blood Glucose.: 129 mg/dL (04-25-25 @ 07:17)    PHYSICAL EXAM  GENERAL  ( x ) NAD, lying in bed comfortably     (  ) obtunded     (  ) lethargic     (  ) somnolent    HEAD  ( x ) Atraumatic     (  ) hematoma     (  ) laceration (specify location:       )     NECK  (x  ) Supple     (  ) neck stiffness     (  ) nuchal rigidity     (  )  no JVD     (  ) JVD present ( -- cm)    HEART  Rate -->  (x  ) normal rate    (  ) bradycardic    (  ) tachycardic  Rhythm -->  (  ) regular    (  ) regularly irregular    (  ) irregularly irregular  Murmurs -->  (  ) normal s1/s2    (  ) systolic murmur    (  ) diastolic murmur    (  ) continuous murmur     (  ) S3 present    (  ) S4 present    LUNGS  ( x )Unlabored respirations     (  ) tachypnea  (  ) B/L air entry     (  ) decreased breath sounds in:  (location     )    (  ) no adventitious sound     (  ) crackles     (  ) wheezing      (  ) rhonchi      (specify location:       )  (  ) chest wall tenderness (specify location:       )    ABDOMEN  (x  ) Soft     (  ) tense   |   (  ) nondistended     (  ) distended   |   (  ) +BS     (  ) hypoactive bowel sounds     (  ) hyperactive bowel sounds  (  ) nontender     (  ) RUQ tenderness     (  ) RLQ tenderness     (  ) LLQ tenderness     (  ) epigastric tenderness     (  ) diffuse tenderness  (  ) Splenomegaly      (  ) Hepatomegaly      (  ) Jaundice     (  ) ecchymosis     EXTREMITIES  (  ) Normal     (  ) Rash     (  ) ecchymosis     (  ) varicose veins      (  ) pitting edema     (  ) non-pitting edema   (  ) ulceration     (  ) gangrene:     (location:     )  (x) warmth and edema of R arm from fingers to elbow    NERVOUS SYSTEM  ( x ) A&Ox3     (  ) confused     (  ) lethargic  CN II-XII:     (  ) Intact     (  ) focal deficits  (Specify:     )   Upper extremities:     (  ) strength X/5     (  ) focal deficit (specify:    )  Lower extremities:     (  ) strength  X/5    (  ) focal deficit (specify:    )    SKIN  (  ) No rashes or lesions     (  ) maculopapular rash     (  ) pustules     (  ) vesicles     (  ) ulcer     (  ) ecchymosis     (specify location:     )    (  ) Indwelling Monae Catheter   Date insterted:    Reason (  ) Critical illness     (  ) urinary retention    (  ) Accurate Ins/Outs Monitoring     (  ) CMO patient    (  ) Central Line  Date inserted:  Location: (  ) Right IJ   (  ) Left IJ   (  ) Right Fem   (  ) Left Fem    (  ) SPC  (  ) pigtail  (  ) PEG tube  (  ) colostomy  (  ) jejunostomy  (  ) U-Dall    LABS             12.3   8.55  )-----------( 263      ( 04-25-25 @ 05:42 )             36.5     136  |  101  |  8   -------------------------<  137   04-25-25 @ 05:42  4.4  |  23  |  0.5    Ca      8.9     04-25-25 @ 05:42  Mg     1.9     04-25-25 @ 05:42    TPro  6.8  /  Alb  3.5  /  TBili  0.9  /  DBili  x   /  AST  15  /  ALT  18  /  AlkPhos  100  /  GGT  x     04-25-25 @ 05:42    PT/INR - ( 04-25-25 @ 05:42 )   PT: 12.10 sec;   INR: 1.02 ratio  PTT - ( 04-24-25 @ 13:05 )  PTT:31.5 sec      Urinalysis Basic - ( 25 Apr 2025 05:42 )    Color: x / Appearance: x / SG: x / pH: x  Gluc: 137 mg/dL / Ketone: x  / Bili: x / Urobili: x   Blood: x / Protein: x / Nitrite: x   Leuk Esterase: x / RBC: x / WBC x   Sq Epi: x / Non Sq Epi: x / Bacteria: x          IMAGING

## 2025-04-25 NOTE — PROGRESS NOTE ADULT - SUBJECTIVE AND OBJECTIVE BOX
GENERAL SURGERY PROGRESS NOTE    Patient: CASSIDY ESCALERA , 64y ([birth year])Female   MRN: [id number]  Location: [address]  Visit: 04-24-25 Inpatient  Date: 04-25-25 @ 11:39    Hospital Day #:2    Procedure/Dx/Injuries: Left hand cellulitis.     Events of past 24 hours: Patient was seen and examined bedside. Patient unable to extend fingers on exam. Patient with left hand tenderness.     PAST MEDICAL & SURGICAL HISTORY:  No pertinent past medical history    Vitals:   T(F): 98 (04-25-25 @ 08:04), Max: 98.5 (04-24-25 @ 20:26)  HR: 75 (04-25-25 @ 08:04)  BP: 159/78 (04-25-25 @ 08:04)  RR: 18 (04-25-25 @ 08:04)  SpO2: 98% (04-25-25 @ 08:04)    Diet, NPO:   Except Medications    Fluids: lactated ringers.: Solution, 1000 milliLiter(s) infuse at 90 mL/Hr    PHYSICAL EXAM:  General: NAD  Cardiac: RRR   Respiratory: normal respiratory effort  Extremity: Well perfused. No area of flactulence, no open wounds seen. No erythema. Right hand swelling and dorsal pitting edema. Fingers are held in flexion.    Unable to Actively extend fingers, Passive ROM flexion and extension intact but with pain. There is a small area of bruising to the volar aspect of the right thumb and a small healed abrasion to the distal thumb. She is moderately tender throughout the hand and fingers.   Neuro: Sensation in Right hand intact.     MEDICATIONS  (STANDING):  ampicillin/sulbactam  IVPB 3 Gram(s) IV Intermittent once  ampicillin/sulbactam  IVPB 3 Gram(s) IV Intermittent every 6 hours  ampicillin/sulbactam  IVPB      aspirin enteric coated 81 milliGRAM(s) Oral daily  chlorhexidine 2% Cloths 1 Application(s) Topical <User Schedule>  dextrose 5%. 1000 milliLiter(s) (50 mL/Hr) IV Continuous <Continuous>  dextrose 5%. 1000 milliLiter(s) (100 mL/Hr) IV Continuous <Continuous>  dextrose 50% Injectable 25 Gram(s) IV Push once  dextrose 50% Injectable 12.5 Gram(s) IV Push once  dextrose 50% Injectable 25 Gram(s) IV Push once  diphtheria/tetanus Vaccine - Adult 0.5 milliLiter(s) IntraMuscular once  enoxaparin Injectable 40 milliGRAM(s) SubCutaneous every 24 hours  ezetimibe 10 milliGRAM(s) Oral daily  gabapentin 200 milliGRAM(s) Oral daily  glucagon  Injectable 1 milliGRAM(s) IntraMuscular once  insulin lispro (ADMELOG) corrective regimen sliding scale   SubCutaneous three times a day before meals  lactated ringers. 1000 milliLiter(s) (90 mL/Hr) IV Continuous <Continuous>  losartan 25 milliGRAM(s) Oral daily  rosuvastatin 40 milliGRAM(s) Oral at bedtime    MEDICATIONS  (PRN):  acetaminophen     Tablet .. 650 milliGRAM(s) Oral every 6 hours PRN Mild Pain (1 - 3)  dextrose Oral Gel 15 Gram(s) Oral once PRN Blood Glucose LESS THAN 70 milliGRAM(s)/deciliter    DVT PROPHYLAXIS: enoxaparin Injectable 40 milliGRAM(s) SubCutaneous every 24 hours    GI PROPHYLAXIS:   ANTICOAGULATION:   ANTIBIOTICS:  ampicillin/sulbactam  IVPB 3 Gram(s)  ampicillin/sulbactam  IVPB 3 Gram(s)  ampicillin/sulbactam  IVPB      LAB/STUDIES:  Labs:    CAPILLARY BLOOD GLUCOSE    POCT Blood Glucose.: 131 mg/dL (25 Apr 2025 10:58)  POCT Blood Glucose.: 129 mg/dL (25 Apr 2025 07:17)                          12.3   8.55  )-----------( 263      ( 25 Apr 2025 05:42 )             36.5       Auto Immature Granulocyte %: 0.6 % (04-25-25 @ 05:42)  Auto Immature Granulocyte %: 0.3 % (04-24-25 @ 13:05)    04-25    136  |  101  |  8[L]  ----------------------------<  137[H]  4.4   |  23  |  0.5[L]      Calcium: 8.9 mg/dL (04-25-25 @ 05:42)      LFTs:             6.8  | 0.9  | 15       ------------------[100     ( 25 Apr 2025 05:42 )  3.5  | x    | 18          Lipase:x      Amylase:x         Lactate, Blood: 1.1 mmol/L (04-24-25 @ 13:05)      Coags:     12.10  ----< 1.02    ( 25 Apr 2025 05:42 )     x         Urinalysis Basic - ( 25 Apr 2025 05:42 )    Color: x / Appearance: x / SG: x / pH: x  Gluc: 137 mg/dL / Ketone: x  / Bili: x / Urobili: x   Blood: x / Protein: x / Nitrite: x   Leuk Esterase: x / RBC: x / WBC x   Sq Epi: x / Non Sq Epi: x / Bacteria: x      IMAGING:    CT Hand w/ IV Cont, Right (04.24.25 @ 17:09)     IMPRESSION:  1.  Small/moderate multilevel flexor tendon tenosynovitis at level of the   metacarpals. Pyotenosynovitis is not excluded.    2.  Small distal radioulnar, radiocarpal and intercarpal joint effusion.    3.  Dorsal hand and distal forearm subcutaneous edema without drainable   fluid collection.    4.  No CT evidence of osteomyelitis.    Xray Forearm, Right (04.24.25 @ 12:46)     Impression:  Dorsal soft tissue swelling without evidence of osseous erosion or soft   tissue gas. No radiopaque foreign body.               GENERAL SURGERY PROGRESS NOTE    Patient: CASSIDY ESCALERA , 64y ([birth year])Female   MRN: [id number]  Location: [address]  Visit: 04-24-25 Inpatient  Date: 04-25-25 @ 11:39    Hospital Day #:2    Procedure/Dx/Injuries: Right hand cellulitis.     Events of past 24 hours: Patient was seen and examined bedside. Patient unable to extend fingers on exam. Patient with right hand tenderness.     PAST MEDICAL & SURGICAL HISTORY:  No pertinent past medical history    Vitals:   T(F): 98 (04-25-25 @ 08:04), Max: 98.5 (04-24-25 @ 20:26)  HR: 75 (04-25-25 @ 08:04)  BP: 159/78 (04-25-25 @ 08:04)  RR: 18 (04-25-25 @ 08:04)  SpO2: 98% (04-25-25 @ 08:04)    Diet, NPO:   Except Medications    Fluids: lactated ringers.: Solution, 1000 milliLiter(s) infuse at 90 mL/Hr    PHYSICAL EXAM:  General: NAD  Cardiac: RRR   Respiratory: normal respiratory effort  Extremity: Well perfused. No area of flatulence no open wounds seen. No erythema. Right hand swelling and dorsal pitting edema. Fingers are held in flexion.    Unable to Actively extend fingers, Passive ROM flexion and extension intact but with pain. There is a small area of bruising to the volar aspect of the right thumb and a small healed abrasion to the distal thumb. She is moderately tender throughout the hand and fingers.   Neuro: Sensation in Right hand intact.     MEDICATIONS  (STANDING):  ampicillin/sulbactam  IVPB 3 Gram(s) IV Intermittent once  ampicillin/sulbactam  IVPB 3 Gram(s) IV Intermittent every 6 hours  ampicillin/sulbactam  IVPB      aspirin enteric coated 81 milliGRAM(s) Oral daily  chlorhexidine 2% Cloths 1 Application(s) Topical <User Schedule>  dextrose 5%. 1000 milliLiter(s) (50 mL/Hr) IV Continuous <Continuous>  dextrose 5%. 1000 milliLiter(s) (100 mL/Hr) IV Continuous <Continuous>  dextrose 50% Injectable 25 Gram(s) IV Push once  dextrose 50% Injectable 12.5 Gram(s) IV Push once  dextrose 50% Injectable 25 Gram(s) IV Push once  diphtheria/tetanus Vaccine - Adult 0.5 milliLiter(s) IntraMuscular once  enoxaparin Injectable 40 milliGRAM(s) SubCutaneous every 24 hours  ezetimibe 10 milliGRAM(s) Oral daily  gabapentin 200 milliGRAM(s) Oral daily  glucagon  Injectable 1 milliGRAM(s) IntraMuscular once  insulin lispro (ADMELOG) corrective regimen sliding scale   SubCutaneous three times a day before meals  lactated ringers. 1000 milliLiter(s) (90 mL/Hr) IV Continuous <Continuous>  losartan 25 milliGRAM(s) Oral daily  rosuvastatin 40 milliGRAM(s) Oral at bedtime    MEDICATIONS  (PRN):  acetaminophen     Tablet .. 650 milliGRAM(s) Oral every 6 hours PRN Mild Pain (1 - 3)  dextrose Oral Gel 15 Gram(s) Oral once PRN Blood Glucose LESS THAN 70 milliGRAM(s)/deciliter    DVT PROPHYLAXIS: enoxaparin Injectable 40 milliGRAM(s) SubCutaneous every 24 hours    GI PROPHYLAXIS:   ANTICOAGULATION:   ANTIBIOTICS:  ampicillin/sulbactam  IVPB 3 Gram(s)  ampicillin/sulbactam  IVPB 3 Gram(s)  ampicillin/sulbactam  IVPB      LAB/STUDIES:  Labs:    CAPILLARY BLOOD GLUCOSE    POCT Blood Glucose.: 131 mg/dL (25 Apr 2025 10:58)  POCT Blood Glucose.: 129 mg/dL (25 Apr 2025 07:17)                          12.3   8.55  )-----------( 263      ( 25 Apr 2025 05:42 )             36.5       Auto Immature Granulocyte %: 0.6 % (04-25-25 @ 05:42)  Auto Immature Granulocyte %: 0.3 % (04-24-25 @ 13:05)    04-25    136  |  101  |  8[L]  ----------------------------<  137[H]  4.4   |  23  |  0.5[L]      Calcium: 8.9 mg/dL (04-25-25 @ 05:42)      LFTs:             6.8  | 0.9  | 15       ------------------[100     ( 25 Apr 2025 05:42 )  3.5  | x    | 18          Lipase:x      Amylase:x         Lactate, Blood: 1.1 mmol/L (04-24-25 @ 13:05)      Coags:     12.10  ----< 1.02    ( 25 Apr 2025 05:42 )     x         Urinalysis Basic - ( 25 Apr 2025 05:42 )    Color: x / Appearance: x / SG: x / pH: x  Gluc: 137 mg/dL / Ketone: x  / Bili: x / Urobili: x   Blood: x / Protein: x / Nitrite: x   Leuk Esterase: x / RBC: x / WBC x   Sq Epi: x / Non Sq Epi: x / Bacteria: x    IMAGING:    CT Hand w/ IV Cont, Right (04.24.25 @ 17:09)     IMPRESSION:  1.  Small/moderate multilevel flexor tendon tenosynovitis at level of the   metacarpals. Pyotenosynovitis is not excluded.    2.  Small distal radioulnar, radiocarpal and intercarpal joint effusion.    3.  Dorsal hand and distal forearm subcutaneous edema without drainable   fluid collection.    4.  No CT evidence of osteomyelitis.    Xray Forearm, Right (04.24.25 @ 12:46)     Impression:  Dorsal soft tissue swelling without evidence of osseous erosion or soft   tissue gas. No radiopaque foreign body.

## 2025-04-25 NOTE — PATIENT PROFILE ADULT - FUNCTIONAL ASSESSMENT - DAILY ACTIVITY SECTION LABEL
Pharmacy Aminoglycoside Initial Note  Date of Service 2022  Patient's  1990  31 year old, male    Weight (Actual):  76.2 kg    Indication: Endocarditis    Current estimated CrCl = Estimated Creatinine Clearance: 89.9 mL/min (A) (based on SCr of 1.26 mg/dL (H)).    Creatinine for last 3 days  2022: 12:22 PM Creatinine 1.33 mg/dL;  4:16 PM Creatinine 1.30 mg/dL;  7:56 PM Creatinine 1.34 mg/dL; 11:54 PM Creatinine 1.35 mg/dL  2022:  3:57 AM Creatinine 1.33 mg/dL;  3:57 AM Creatinine 1.33 mg/dL;  8:20 AM Creatinine 1.47 mg/dL;  9:27 AM Creatinine 1.50 mg/dL; 12:31 PM Creatinine 1.60 mg/dL; 12:40 PM Creatinine 1.59 mg/dL;  7:22 PM Creatinine 1.72 mg/dL  2022: 12:07 AM Creatinine 1.48 mg/dL;  3:47 AM Creatinine 1.33 mg/dL;  3:47 AM Creatinine 1.33 mg/dL;  8:25 AM Creatinine 1.33 mg/dL; 11:51 AM Creatinine 1.24 mg/dL;  3:40 PM Creatinine 1.17 mg/dL;  9:02 PM Creatinine 1.32 mg/dL  2022: 12:43 AM Creatinine 1.35 mg/dL;  3:27 AM Creatinine 1.24 mg/dL;  8:54 AM Creatinine 1.26 mg/dL     Nephrotoxins and other renal medications (From now, onward)    Start     Dose/Rate Route Frequency Ordered Stop    22 1230  gentamicin (GARAMYCIN) infusion 80 mg         80 mg  over 60 Minutes Intravenous EVERY 12 HOURS 22 1203      22 1200  furosemide (LASIX) 500 mg/50mL infusion ADULT MAX CONC         5-10 mg/hr  0.5-1 mL/hr  Intravenous CONTINUOUS 22 1156      22 1930  norepinephrine (LEVOPHED) 16 mg in  mL infusion MAX CONC CENTRAL LINE         0.01-0.4 mcg/kg/min × 74.8 kg (Dosing Weight)  0.7-28.1 mL/hr  Intravenous CONTINUOUS 22  vasopressin 1 unit/mL MAX Conc (PITRESSIN) infusion         0.5-4 Units/hr  0.5-4 mL/hr  Intravenous CONTINUOUS PRN 22            Contrast Orders - past 72 hours (72h ago, onward)            None          Aminoglycoside Levels - past 2 days  2022: 12:40 PM Gentamicin 1.0  mg/L    Aminoglycosides IV Administrations (past 72 hours)                   gentamicin (GARAMYCIN) 220 mg in sodium chloride 0.9 % 50 mL intermittent infusion (mg) 220 mg New Bag 01/21/22 1417     220 mg New Bag 01/20/22 1317                    Plan:  1.  Start Gentamicin 80 mg (1 mg/kg) IV q12h.  Despite CrCl being acceptable for q8h dosing, patient has developed an EVETTE almost double his baseline creatinine so decided q12h dosing for now until EVETTE resolves.  2.  Target goals based on synergy dosing  3.  Goal peak level: 3-5 mg/L  4.  Goal trough level: <1 mg/L  5.  Pharmacy will continue to follow and check levels as appropriate in 1-3 Days      Nila Hooper, FionaD     .

## 2025-04-25 NOTE — PROGRESS NOTE ADULT - ASSESSMENT
65 y/o female h/o HTN, DL, ? DM was gardening outside and cut her right hand several days ago, subsequently developed right hand diffuse pain and swelling, denies fever, tactile temp, chills, paresthesias, focal weakness, or other associated complaints at present. Pt denies recent heavy lifting or trauma.    # Right Hand cellulitis with  tenosynovitis  - xray hand: Dorsal soft tissue swelling without evidence of osseous erosion or soft tissue gas. No radiopaque foreign body.  - CT right hand:  Small/moderate multilevel flexor tendon tenosynovitis at level of the metacarpals. Small distal radioulnar, radiocarpal and intercarpal joint effusion. Dorsal hand and distal forearm subcutaneous edema without drainable fluid collection.  No CT evidence of osteomyelitis.  - Sedimentation Rate, Erythrocyte: 87 mm/Hr (04.24.25 @ 13:05)  - C-Reactive Protein: 66.2 mg/L (04.24.25 @ 13:05)  - ID eval:  start Unasyn 3 gm iv q6h  - DT vaccine  - Hand surgery : no acute surgical intervention   - hand elevation to reduce swelling     # DM type2  - A1C with Estimated Average Glucose Result: 6.8. % (04.25.25 @ 05:42)  - monitor FS  - c/w insulin    # Hypertension  - c/w losartan    # Dyslipidemia  -c/w statin , ezetimibe     # DVT prophylaxis    # Full code    # Pending: clinical improvement  # Discussed plan of care with patients family  # Disposition: Home when stable

## 2025-04-25 NOTE — PROGRESS NOTE ADULT - ASSESSMENT
ASSESSMENT:  64yF w/ no PMH presenting with 4 days of worsening right hand pain and swelling after gardening on Sunday. Labs show elevation of inflammatory markers CRP and ESR with normal WBC. Xrays show soft tissue swelling. Patient likely with worsening cellulitis in right hand and fingers    PLAN:  - no acute surgical intervention   - hand elevation to reduce swelling   - continue IV antibiotics  - repeat inflammatory markers ESR, CRP   - rest of care per primary team     GREEN TEAM SPECTRA: [id number] ASSESSMENT:  64yF w/ no PMH presenting with 4 days of worsening right hand pain and swelling after gardening on Sunday.     PLAN:  - no acute surgical intervention   - hand elevation to reduce swelling   - continue IV antibiotics on unasyn per ID recs  - repeat inflammatory markers ESR, CRP   - rest of care per primary team     GREEN TEAM SPECTRA: [id number]

## 2025-04-25 NOTE — CONSULT NOTE ADULT - ASSESSMENT
63 y/o female h/o HTN, DL, ? DM was gardening outside and cut her right hand several days ago, subsequently developed right hand diffuse pain and swelling, denies fever, tactile temp, chills, paresthesias, focal weakness, or other associated complaints at present. Pt denies recent heavy lifting or trauma.      -Labs showed ESR in 80s, CRP 60s, wbc normal, other labs unremarkable      ID consulted for diagnostic work up and antimicrobial treatment of cellulitis    IMPRESSION/RECOMMENDATIONS  Immunosuppression/Immunosenescence ( above age 60 yrs there is a exponential decline in immunity which could result in poor clinical outcomes.   Cellulitis right hand ( dorsum ) with tenosynovitis and reactive synovitis  No septic arthritis  No abscess  No OM  Bacterial process   No evidence clinically of a fungal infection    < from: CT Hand w/ IV Cont, Right (04.24.25 @ 17:09) >  1.  Common flexor tendon sheath edema and inflammatory change, most consistent with tenosynovitis, which may be pyogenic in etiology.   Recommend surgical consultation.  2.  Multiple small joint space effusions with thickened/enhancing synovium, which may represent reactive synovitis.    < end of copied text >  4/24 xray hand: Dorsal soft tissue swelling without evidence of osseous erosion or soft tissue gas. No radiopaque foreign body.  WBC 8.5  4/24 ESR/CRP 87/66.2    HTN  Diabetes    -FU with Hand   -Keep hand elevated  -DT vaccine IMPRESSION; x one  -start Unasyn 3 gm iv q6h  -d/c Vanco/Zosyn    Discussion of management/test results( independently interpretated by me ) /antibiotic regimen  with external/primary medical team. Dr Goodwin

## 2025-04-25 NOTE — PROGRESS NOTE ADULT - ASSESSMENT
65 y/o female h/o HTN, DL, ? DM was gardening outside and cut her right hand several days ago, subsequently developed right hand diffuse pain and swelling. Admitted for hand cellulitis.    #Hand cellulitis  -patient was gardening and cut herself, few days later became painful and swollen   -no sepsis on admission, vitals stable, hand movement is limited by pain and edema  - PE exhibits edema, pain and warmth  -Labs showed ESR in 80s, CRP 60s, wbc normal, other labs unremarkable  -xray hand: Dorsal soft tissue swelling without evidence of osseous erosion or soft                          tissue gas. No radiopaque foreign body.  - CT: 1.  Small/moderate multilevel flexor tendon tenosynovitis at level of the metacarpals. Pyotenosynovitis is not excluded. 2.  Small distal radioulnar, radiocarpal and intercarpal joint effusion. 3.  Dorsal hand and distal forearm subcutaneous edema without drainable fluid collection. 4.  No CT evidence of osteomyelitis.  -Plan for Surgery by hand surgery today  - ID on board: Unasyn 3g q6h and DT vaccine    #DL  #HTN  #?DM  -c/w Losartan  -c/w statin and ezetimibe   -insulin sliding sliding, check a1c  -c/w gabapentin    MISC  DVT ppx: Lovenox  GI ppx: none  Diet: DASH  Activity: IAT    Pending: Hand sx

## 2025-04-25 NOTE — CONSULT NOTE ADULT - ADDITIONAL PE
R hand: edema of dorsum, small erythematous area thumb distally medially. Flexion of fingers 2-4th associated with pain/edema

## 2025-04-26 LAB
ALBUMIN SERPL ELPH-MCNC: 3.4 G/DL — LOW (ref 3.5–5.2)
ALP SERPL-CCNC: 96 U/L — SIGNIFICANT CHANGE UP (ref 30–115)
ALT FLD-CCNC: 16 U/L — SIGNIFICANT CHANGE UP (ref 0–41)
ANION GAP SERPL CALC-SCNC: 11 MMOL/L — SIGNIFICANT CHANGE UP (ref 7–14)
AST SERPL-CCNC: 15 U/L — SIGNIFICANT CHANGE UP (ref 0–41)
BASOPHILS # BLD AUTO: 0.03 K/UL — SIGNIFICANT CHANGE UP (ref 0–0.2)
BASOPHILS NFR BLD AUTO: 0.4 % — SIGNIFICANT CHANGE UP (ref 0–1)
BILIRUB SERPL-MCNC: 0.7 MG/DL — SIGNIFICANT CHANGE UP (ref 0.2–1.2)
BUN SERPL-MCNC: 12 MG/DL — SIGNIFICANT CHANGE UP (ref 10–20)
CALCIUM SERPL-MCNC: 8.9 MG/DL — SIGNIFICANT CHANGE UP (ref 8.4–10.5)
CHLORIDE SERPL-SCNC: 101 MMOL/L — SIGNIFICANT CHANGE UP (ref 98–110)
CO2 SERPL-SCNC: 27 MMOL/L — SIGNIFICANT CHANGE UP (ref 17–32)
CREAT SERPL-MCNC: 0.6 MG/DL — LOW (ref 0.7–1.5)
EGFR: 99 ML/MIN/1.73M2 — SIGNIFICANT CHANGE UP
EGFR: 99 ML/MIN/1.73M2 — SIGNIFICANT CHANGE UP
EOSINOPHIL # BLD AUTO: 0.12 K/UL — SIGNIFICANT CHANGE UP (ref 0–0.7)
EOSINOPHIL NFR BLD AUTO: 1.4 % — SIGNIFICANT CHANGE UP (ref 0–8)
GLUCOSE BLDC GLUCOMTR-MCNC: 127 MG/DL — HIGH (ref 70–99)
GLUCOSE BLDC GLUCOMTR-MCNC: 166 MG/DL — HIGH (ref 70–99)
GLUCOSE BLDC GLUCOMTR-MCNC: 173 MG/DL — HIGH (ref 70–99)
GLUCOSE BLDC GLUCOMTR-MCNC: 99 MG/DL — SIGNIFICANT CHANGE UP (ref 70–99)
GLUCOSE SERPL-MCNC: 121 MG/DL — HIGH (ref 70–99)
HCT VFR BLD CALC: 37.9 % — SIGNIFICANT CHANGE UP (ref 37–47)
HGB BLD-MCNC: 12.4 G/DL — SIGNIFICANT CHANGE UP (ref 12–16)
IMM GRANULOCYTES NFR BLD AUTO: 0.5 % — HIGH (ref 0.1–0.3)
LYMPHOCYTES # BLD AUTO: 0.98 K/UL — LOW (ref 1.2–3.4)
LYMPHOCYTES # BLD AUTO: 11.7 % — LOW (ref 20.5–51.1)
MAGNESIUM SERPL-MCNC: 2.1 MG/DL — SIGNIFICANT CHANGE UP (ref 1.8–2.4)
MCHC RBC-ENTMCNC: 29.9 PG — SIGNIFICANT CHANGE UP (ref 27–31)
MCHC RBC-ENTMCNC: 32.7 G/DL — SIGNIFICANT CHANGE UP (ref 32–37)
MCV RBC AUTO: 91.3 FL — SIGNIFICANT CHANGE UP (ref 81–99)
MONOCYTES # BLD AUTO: 0.84 K/UL — HIGH (ref 0.1–0.6)
MONOCYTES NFR BLD AUTO: 10 % — HIGH (ref 1.7–9.3)
NEUTROPHILS # BLD AUTO: 6.35 K/UL — SIGNIFICANT CHANGE UP (ref 1.4–6.5)
NEUTROPHILS NFR BLD AUTO: 76 % — HIGH (ref 42.2–75.2)
NRBC BLD AUTO-RTO: 0 /100 WBCS — SIGNIFICANT CHANGE UP (ref 0–0)
PLATELET # BLD AUTO: 296 K/UL — SIGNIFICANT CHANGE UP (ref 130–400)
PMV BLD: 9.5 FL — SIGNIFICANT CHANGE UP (ref 7.4–10.4)
POTASSIUM SERPL-MCNC: 4.5 MMOL/L — SIGNIFICANT CHANGE UP (ref 3.5–5)
POTASSIUM SERPL-SCNC: 4.5 MMOL/L — SIGNIFICANT CHANGE UP (ref 3.5–5)
PROT SERPL-MCNC: 6.7 G/DL — SIGNIFICANT CHANGE UP (ref 6–8)
RBC # BLD: 4.15 M/UL — LOW (ref 4.2–5.4)
RBC # FLD: 14.2 % — SIGNIFICANT CHANGE UP (ref 11.5–14.5)
SODIUM SERPL-SCNC: 139 MMOL/L — SIGNIFICANT CHANGE UP (ref 135–146)
VANCOMYCIN TROUGH SERPL-MCNC: <4 UG/ML — LOW (ref 5–10)
WBC # BLD: 8.36 K/UL — SIGNIFICANT CHANGE UP (ref 4.8–10.8)
WBC # FLD AUTO: 8.36 K/UL — SIGNIFICANT CHANGE UP (ref 4.8–10.8)

## 2025-04-26 PROCEDURE — 99232 SBSQ HOSP IP/OBS MODERATE 35: CPT

## 2025-04-26 RX ORDER — DOXYCYCLINE HYCLATE 100 MG
100 TABLET ORAL EVERY 12 HOURS
Refills: 0 | Status: DISCONTINUED | OUTPATIENT
Start: 2025-04-26 | End: 2025-04-26

## 2025-04-26 RX ORDER — SULFAMETHOXAZOLE/TRIMETHOPRIM 800-160 MG
1 TABLET ORAL
Refills: 0 | DISCHARGE

## 2025-04-26 RX ORDER — AMOXICILLIN AND CLAVULANATE POTASSIUM 500; 125 MG/1; MG/1
1 TABLET, FILM COATED ORAL
Refills: 0 | Status: DISCONTINUED | OUTPATIENT
Start: 2025-04-26 | End: 2025-04-26

## 2025-04-26 RX ORDER — AMOXICILLIN AND CLAVULANATE POTASSIUM 500; 125 MG/1; MG/1
1 TABLET, FILM COATED ORAL
Qty: 18 | Refills: 0
Start: 2025-04-26 | End: 2025-05-04

## 2025-04-26 RX ORDER — AMPICILLIN SODIUM AND SULBACTAM SODIUM 1; .5 G/1; G/1
3 INJECTION, POWDER, FOR SOLUTION INTRAMUSCULAR; INTRAVENOUS EVERY 6 HOURS
Refills: 0 | Status: DISCONTINUED | OUTPATIENT
Start: 2025-04-26 | End: 2025-04-28

## 2025-04-26 RX ORDER — DOXYCYCLINE HYCLATE 100 MG
1 TABLET ORAL
Qty: 20 | Refills: 0
Start: 2025-04-26 | End: 2025-05-05

## 2025-04-26 RX ADMIN — Medication 100 MILLIGRAM(S): at 10:45

## 2025-04-26 RX ADMIN — LOSARTAN POTASSIUM 25 MILLIGRAM(S): 100 TABLET, FILM COATED ORAL at 05:08

## 2025-04-26 RX ADMIN — INSULIN LISPRO 1: 100 INJECTION, SOLUTION INTRAVENOUS; SUBCUTANEOUS at 11:32

## 2025-04-26 RX ADMIN — Medication 81 MILLIGRAM(S): at 11:41

## 2025-04-26 RX ADMIN — Medication 650 MILLIGRAM(S): at 22:13

## 2025-04-26 RX ADMIN — ENOXAPARIN SODIUM 40 MILLIGRAM(S): 100 INJECTION SUBCUTANEOUS at 11:40

## 2025-04-26 RX ADMIN — Medication 1 APPLICATION(S): at 05:51

## 2025-04-26 RX ADMIN — AMPICILLIN SODIUM AND SULBACTAM SODIUM 200 GRAM(S): 1; .5 INJECTION, POWDER, FOR SOLUTION INTRAMUSCULAR; INTRAVENOUS at 17:11

## 2025-04-26 RX ADMIN — GABAPENTIN 200 MILLIGRAM(S): 400 CAPSULE ORAL at 11:40

## 2025-04-26 RX ADMIN — Medication 650 MILLIGRAM(S): at 21:13

## 2025-04-26 RX ADMIN — ROSUVASTATIN CALCIUM 40 MILLIGRAM(S): 20 TABLET, FILM COATED ORAL at 21:13

## 2025-04-26 RX ADMIN — Medication 650 MILLIGRAM(S): at 13:11

## 2025-04-26 RX ADMIN — Medication 650 MILLIGRAM(S): at 14:56

## 2025-04-26 RX ADMIN — AMPICILLIN SODIUM AND SULBACTAM SODIUM 200 GRAM(S): 1; .5 INJECTION, POWDER, FOR SOLUTION INTRAMUSCULAR; INTRAVENOUS at 13:03

## 2025-04-26 RX ADMIN — EZETIMIBE 10 MILLIGRAM(S): 10 TABLET ORAL at 11:41

## 2025-04-26 RX ADMIN — AMPICILLIN SODIUM AND SULBACTAM SODIUM 200 GRAM(S): 1; .5 INJECTION, POWDER, FOR SOLUTION INTRAMUSCULAR; INTRAVENOUS at 05:08

## 2025-04-26 RX ADMIN — AMPICILLIN SODIUM AND SULBACTAM SODIUM 200 GRAM(S): 1; .5 INJECTION, POWDER, FOR SOLUTION INTRAMUSCULAR; INTRAVENOUS at 23:31

## 2025-04-26 NOTE — PROGRESS NOTE ADULT - ASSESSMENT
65 y/o female h/o HTN, DL, ? DM was gardening outside and cut her right hand several days ago, subsequently developed right hand diffuse pain and swelling, denies fever, tactile temp, chills, paresthesias, focal weakness, or other associated complaints at present. Pt denies recent heavy lifting or trauma.    # Right Hand cellulitis with  tenosynovitis  - xray hand: Dorsal soft tissue swelling without evidence of osseous erosion or soft tissue gas. No radiopaque foreign body.  - CT right hand:  Small/moderate multilevel flexor tendon tenosynovitis at level of the metacarpals. Small distal radioulnar, radiocarpal and intercarpal joint effusion. Dorsal hand and distal forearm subcutaneous edema without drainable fluid collection.  No CT evidence of osteomyelitis.  - Sedimentation Rate, Erythrocyte: 87 mm/Hr (04.24.25 @ 13:05)  - C-Reactive Protein: 66.2 mg/L (04.24.25 @ 13:05)  - c/w  Unasyn 3 gm iv q6h  - DT vaccine  - Hand surgery : no acute surgical intervention   - hand elevation to reduce swelling     # DM type2  - A1C with Estimated Average Glucose Result: 6.8. % (04.25.25 @ 05:42)  - monitor FS  - c/w insulin    # Hypertension  - c/w losartan    # Dyslipidemia  -c/w statin , ezetimibe     # DVT prophylaxis    # Full code    # Pending: clinical improvement  # Discussed plan of care with patients family  # Disposition: Home when stable

## 2025-04-26 NOTE — DISCHARGE NOTE PROVIDER - NSDCMRMEDTOKEN_GEN_ALL_CORE_FT
amoxicillin-clavulanate 875 mg-125 mg oral tablet: 1 tab(s) orally 2 times a day For 9 more days then stop  aspirin 81 mg oral tablet: 1 tab(s) orally once a day  doxycycline monohydrate 100 mg oral capsule: 1 cap(s) orally 2 times a day For 9 more days then stop  Eye drops for lubrication:   ezetimibe 10 mg oral tablet: 1 tab(s) orally once a day  gabapentin 100 mg oral capsule: 2 cap(s) orally once a day  losartan 25 mg oral tablet: 1 tab(s) orally once a day  metFORMIN 1000 mg oral tablet: 1 tab(s) orally 2 times a day  rosuvastatin 40 mg oral tablet: 1 tab(s) orally once a day (at bedtime)  Vitamin D 50,000 once a week (total 12 pills):    acetaminophen 325 mg oral tablet: 2 tab(s) orally every 6 hours as needed for Mild Pain (1 - 3)  amoxicillin-clavulanate 875 mg-125 mg oral tablet: 1 tab(s) orally 2 times a day For 9 more days then stop  aspirin 81 mg oral tablet: 1 tab(s) orally once a day  doxycycline monohydrate 100 mg oral capsule: 1 cap(s) orally 2 times a day For 9 more days then stop  Eye drops for lubrication:   ezetimibe 10 mg oral tablet: 1 tab(s) orally once a day  gabapentin 100 mg oral capsule: 2 cap(s) orally once a day  losartan 25 mg oral tablet: 1 tab(s) orally once a day  metFORMIN 1000 mg oral tablet: 1 tab(s) orally 2 times a day  rosuvastatin 40 mg oral tablet: 1 tab(s) orally once a day (at bedtime)  traMADol 50 mg oral tablet: 1 tab(s) orally every 8 hours as needed for Moderate Pain (4 - 6) MDD: 3 tablets  Vitamin D 50,000 once a week (total 12 pills):

## 2025-04-26 NOTE — PROGRESS NOTE ADULT - SUBJECTIVE AND OBJECTIVE BOX
"Palliative Care Advance Care Planning Progress Note    General: Marco Antonio Ortiz is a 67 year-old-male transferred from [facility] 5/8/2019 for sepsis related to recurrent skin infections.  Past medical history is significant for cutaneous T-cell lymphoma.    Discussion: Returned call to Mandy x2 (required due to Mandy blocking the hospital phone number due to bill collections).  When asked if this was a good time she stated \"I've been trying to cook a meatloaf.\"  Explained reason for call was to ensure she understands her 's conditions and wishes based on our conversation.    Asked if Mandy had concerns related to code status change to DNR/I based on the patient's wishes.  She stated \"we'll I'd want him to get it if there was a chance.\"  After explaining patient's clinical picture and low chances of survival and lower chances for him to return to prior function, Mandy agreed with patient's decision for DNR/I.  She explained he has been suffering for some time related to his cancer.  She again expressed distress/concern over taking care of patient at home.    Discussed that patient would be a candidate for [facility] related to his high level wound care needs.  She asked \"I don't know what to do after (death).\"  Explained resources for Mandy through hospice and the hospital.  Also discussed referral process to hospice.  She agrees that if the patient declines further, is unable to express his wishes, or expresses wishes for comfort focused treatment that hospice would be a good choice.  All questions answered.     Provided therapeutic communication including open ended questions, therapeutic silence, reflective listening, and normalization of thoughts/feelings throughout encounter. Confirmed Mandy has palliative care number and encouraged her to call with any questions or needs.     Outcome: Changed patient's code status to DNR/DNI.  Discussed [facility] with patient and his wife Mandy (via phone).  "     Recommendations:  If patient does not improve or declines further, would recommend hospice referral for [facility] hospice for wound care.  Reach out to patient's wife Mandy with clinical status (if you do not reach Mandy on the first call, call a second time - she has # blocked and doesn't know how to un-block).     Updated: Dr. Sanderson    Thank you for allowing Palliative Care to participate in this patient's care. Please call our team with questions and/or additional needs.    Total call time was 20 minutes discussing advance care planning.    HERO Loo.  Palliative Care Nurse Practitioner  [phone]           CASSIDY ESCALERA  64y, Female    All available historical data reviewed    OVERNIGHT EVENTS:  feels well and has no new complaints  No fevers     ROS:  General: Denies rigors, nightsweats  HEENT: Denies headache, rhinorrhea, sore throat, eye pain  CV: Denies CP, palpitations  PULM: Denies wheezing, hemoptysis  GI: Denies hematemesis, hematochezia, melena  : Denies discharge, hematuria  MSK: pain right hand  SKIN: Denies rash, lesions  NEURO: Denies paresthesias, weakness  PSYCH: Denies depression, anxiety    VITALS:  T(F): 98.5, Max: 98.9 (04-25-25 @ 15:00)  HR: 88  BP: 126/74  RR: 17Vital Signs Last 24 Hrs  T(C): 36.9 (26 Apr 2025 07:00), Max: 37.2 (25 Apr 2025 15:00)  T(F): 98.5 (26 Apr 2025 07:00), Max: 98.9 (25 Apr 2025 15:00)  HR: 88 (26 Apr 2025 07:00) (81 - 88)  BP: 126/74 (26 Apr 2025 07:00) (126/74 - 148/72)  BP(mean): --  RR: 17 (26 Apr 2025 07:00) (17 - 18)  SpO2: 98% (26 Apr 2025 07:00) (96% - 98%)    Parameters below as of 26 Apr 2025 07:00  Patient On (Oxygen Delivery Method): room air        TESTS & MEASUREMENTS:                        12.4   8.36  )-----------( 296      ( 26 Apr 2025 06:37 )             37.9     04-26    139  |  101  |  12  ----------------------------<  121[H]  4.5   |  27  |  0.6[L]    Ca    8.9      26 Apr 2025 06:37  Mg     2.1     04-26    TPro  6.7  /  Alb  3.4[L]  /  TBili  0.7  /  DBili  x   /  AST  15  /  ALT  16  /  AlkPhos  96  04-26    LIVER FUNCTIONS - ( 26 Apr 2025 06:37 )  Alb: 3.4 g/dL / Pro: 6.7 g/dL / ALK PHOS: 96 U/L / ALT: 16 U/L / AST: 15 U/L / GGT: x             Culture - Blood (collected 04-24-25 @ 13:12)  Source: Blood Blood-Peripheral  Preliminary Report (04-25-25 @ 22:02):    No growth at 24 hours    Culture - Blood (collected 04-24-25 @ 13:12)  Source: Blood Blood-Peripheral  Preliminary Report (04-25-25 @ 22:02):    No growth at 24 hours      Urinalysis Basic - ( 26 Apr 2025 06:37 )    Color: x / Appearance: x / SG: x / pH: x  Gluc: 121 mg/dL / Ketone: x  / Bili: x / Urobili: x   Blood: x / Protein: x / Nitrite: x   Leuk Esterase: x / RBC: x / WBC x   Sq Epi: x / Non Sq Epi: x / Bacteria: x          Social History:  Tobacco Use: No  Alcohol Use: No  Drug Use: No    RADIOLOGY & ADDITIONAL TESTS:  Personal review of radiological diagnostics performed  Echo and EKG results noted when applicable.     MEDICATIONS:  acetaminophen     Tablet .. 650 milliGRAM(s) Oral every 6 hours PRN  ampicillin/sulbactam  IVPB 3 Gram(s) IV Intermittent every 6 hours  ampicillin/sulbactam  IVPB      aspirin enteric coated 81 milliGRAM(s) Oral daily  chlorhexidine 2% Cloths 1 Application(s) Topical <User Schedule>  dextrose 5%. 1000 milliLiter(s) IV Continuous <Continuous>  dextrose 5%. 1000 milliLiter(s) IV Continuous <Continuous>  dextrose 50% Injectable 25 Gram(s) IV Push once  dextrose 50% Injectable 12.5 Gram(s) IV Push once  dextrose 50% Injectable 25 Gram(s) IV Push once  dextrose Oral Gel 15 Gram(s) Oral once PRN  diphtheria/tetanus Vaccine - Adult 0.5 milliLiter(s) IntraMuscular once  enoxaparin Injectable 40 milliGRAM(s) SubCutaneous every 24 hours  ezetimibe 10 milliGRAM(s) Oral daily  gabapentin 200 milliGRAM(s) Oral daily  glucagon  Injectable 1 milliGRAM(s) IntraMuscular once  insulin lispro (ADMELOG) corrective regimen sliding scale   SubCutaneous three times a day before meals  losartan 25 milliGRAM(s) Oral daily  rosuvastatin 40 milliGRAM(s) Oral at bedtime      ANTIBIOTICS:  ampicillin/sulbactam  IVPB 3 Gram(s) IV Intermittent every 6 hours  ampicillin/sulbactam  IVPB

## 2025-04-26 NOTE — PROGRESS NOTE ADULT - SUBJECTIVE AND OBJECTIVE BOX
Patient is a 64y old  Female who presents with a chief complaint of Hand swelling (25 Apr 2025 11:39)    Patient was seen and examined.  Continues to  right hand swelling  Denies any other complaints.  All systems reviewed positive history as mentioned above.    Allergies  No Known Allergies    MEDICATIONS  (STANDING):  aspirin enteric coated 81 milliGRAM(s) Oral daily  chlorhexidine 2% Cloths 1 Application(s) Topical <User Schedule>  diphtheria/tetanus Vaccine - Adult 0.5 milliLiter(s) IntraMuscular once  doxycycline monohydrate Capsule 100 milliGRAM(s) Oral every 12 hours  enoxaparin Injectable 40 milliGRAM(s) SubCutaneous every 24 hours  ezetimibe 10 milliGRAM(s) Oral daily  gabapentin 200 milliGRAM(s) Oral daily  glucagon  Injectable 1 milliGRAM(s) IntraMuscular once  insulin lispro (ADMELOG) corrective regimen sliding scale   SubCutaneous three times a day before meals  losartan 25 milliGRAM(s) Oral daily  rosuvastatin 40 milliGRAM(s) Oral at bedtime    MEDICATIONS  (PRN):  acetaminophen     Tablet .. 650 milliGRAM(s) Oral every 6 hours PRN Mild Pain (1 - 3)  dextrose Oral Gel 15 Gram(s) Oral once PRN Blood Glucose LESS THAN 70 milliGRAM(s)/deciliter    T(C): 36.9 (04-26-25 @ 07:00), Max: 37.2 (04-25-25 @ 15:00)  HR: 88 (04-26-25 @ 07:00) (81 - 88)  BP: 126/74 (04-26-25 @ 07:00) (126/74 - 148/72)  RR: 17 (04-26-25 @ 07:00) (17 - 18)  SpO2: 98% (04-26-25 @ 07:00) (96% - 98%)    O/E:  Awake, alert, not in distress.  HEENT: atraumatic, EOMI.  Chest: clear.  CVS: SIS2 +, no murmur.  P/A: Soft, BS+  CNS: awake, alert  Ext: no edema feet. Right hand swelling +, dressing+  All systems reviewed positive findings as above.                          12.4   8.36  )-----------( 296      ( 26 Apr 2025 06:37 )             37.9                         12.3   8.60  )-----------( 255      ( 25 Apr 2025 18:18 )             36.2[L]    04-26    139  |  101  |  12  ----------------------------<  121[H]  4.5   |  27  |  0.6[L]  04-25    136  |  101  |  8[L]  ----------------------------<  137[H]  4.4   |  23  |  0.5[L]    Ca    8.9      26 Apr 2025 06:37  Ca    8.9      25 Apr 2025 05:42  Ca    9.4      24 Apr 2025 13:05  Mg     2.1     04-26    TPro  6.7  /  Alb  3.4[L]  /  TBili  0.7  /  DBili  x   /  AST  15  /  ALT  16  /  AlkPhos  96  04-26  TPro  6.8  /  Alb  3.5  /  TBili  0.9  /  DBili  x   /  AST  15  /  ALT  18  /  AlkPhos  100  04-25  TPro  7.7  /  Alb  4.1  /  TBili  0.6  /  DBili  x   /  AST  20  /  ALT  23  /  AlkPhos  105  04-24

## 2025-04-26 NOTE — DISCHARGE NOTE PROVIDER - HOSPITAL COURSE
65 y/o female h/o HTN, DL, ? DM was gardening outside and cut her right hand several days ago, subsequently developed right hand diffuse pain and swelling, denies fever, tactile temp, chills, paresthesias, focal weakness, or other associated complaints at present. Pt denies recent heavy lifting or trauma.    In the ED vitals:  · BP Systolic	135 mm Hg  · BP Diastolic	83 mm Hg  · Heart Rate	 114 /min  · Respiration Rate (breaths/min)	18 /min  · Temp (F)	98.2 Degrees F  · Temp (C)	36.8 Degrees C  · Temp site	oral  · SpO2 (%)	100 %  · O2 Delivery/Oxygen Delivery Method	room air  · Temp at ED Arrival (C)	36.8 Degrees C    -Labs showed ESR in 80s, CRP 60s, wbc normal, other labs unremarkable  -xray hand: Dorsal soft tissue swelling without evidence of osseous erosion or soft                          tissue gas. No radiopaque foreign body.    Surgery evaluation: recommended abx, ID cx, CT right hand, Posey block      # Right Hand cellulitis with  tenosynovitis  - xray hand: Dorsal soft tissue swelling without evidence of osseous erosion or soft tissue gas. No radiopaque foreign body.  - CT right hand:  Small/moderate multilevel flexor tendon tenosynovitis at level of the metacarpals. Small distal radioulnar, radiocarpal and intercarpal joint effusion. Dorsal hand and distal forearm subcutaneous edema without drainable fluid collection.  No CT evidence of osteomyelitis.  - Sedimentation Rate, Erythrocyte: 87 mm/Hr (04.24.25 @ 13:05)  - C-Reactive Protein: 66.2 mg/L (04.24.25 @ 13:05)  - s/p Unasyn 3 gm iv q6h, dc on Augmentin and Doxy for total of 10 days  - DT vaccine  - Hand surgery : no acute surgical intervention   - hand elevation to reduce swelling     # DM type2  - A1C with Estimated Average Glucose Result: 6.8. % (04.25.25 @ 05:42)  - monitor FS  - c/w insulin    # Hypertension  - c/w losartan    # Dyslipidemia  -c/w statin , ezetimibe    63 y/o female h/o HTN, DL, ? DM was gardening outside and cut her right hand several days ago, subsequently developed right hand diffuse pain and swelling, denies fever, tactile temp, chills, paresthesias, focal weakness, or other associated complaints at present. Pt denies recent heavy lifting or trauma.    In the ED vitals:  · /88  Heart Rate 114 /min RR 18 /min Temp 98.2 Degrees F SpO2 (%) 100 % on RA    -Labs showed ESR in 80s, CRP 60s, wbc normal, other labs unremarkable  -xray hand: Dorsal soft tissue swelling without evidence of osseous erosion or soft                          tissue gas. No radiopaque foreign body.    Surgery evaluation: recommended abx, ID cx, CT right hand, Beau block    # Right Hand cellulitis with  tenosynovitis  - xray hand: Dorsal soft tissue swelling without evidence of osseous erosion or soft tissue gas. No radiopaque foreign body.  - CT right hand:  Small/moderate multilevel flexor tendon tenosynovitis at level of the metacarpals. Small distal radioulnar, radiocarpal and intercarpal joint effusion. Dorsal hand and distal forearm subcutaneous edema without drainable fluid collection.  No CT evidence of osteomyelitis.  - Sedimentation Rate, Erythrocyte: 87 mm/Hr (04.24.25 @ 13:05)  - C-Reactive Protein: 66.2 mg/L (04.24.25 @ 13:05)  - c/w  Unasyn 3 gm iv q6h  - DT vaccine  - Hand surgery : no acute surgical intervention   - hand elevation to reduce swelling   - abx per ID    # DM type2  - A1C 6.8. %  - monitor FS  - c/w insulin    # Hypertension  - c/w losartan    # Dyslipidemia  -c/w statin , ezetimibe   Discussion of discharge plan of care, including discharge diagnoses, medication reconciliation, and follow-ups was discussed with Dr. Rodriguez on 4/28/25 and discharge was approved.       63 y/o female h/o HTN, DL, ? DM was gardening outside and cut her right hand several days ago, subsequently developed right hand diffuse pain and swelling, denies fever, tactile temp, chills, paresthesias, focal weakness, or other associated complaints at present. Pt denies recent heavy lifting or trauma.    In the ED vitals:  · /88  Heart Rate 114 /min RR 18 /min Temp 98.2 Degrees F SpO2 (%) 100 % on RA    -Labs showed ESR in 80s, CRP 60s, wbc normal, other labs unremarkable  -xray hand: Dorsal soft tissue swelling without evidence of osseous erosion or soft                          tissue gas. No radiopaque foreign body.    Surgery evaluation: recommended abx, ID cx, CT right hand, Beau block    # Right Hand cellulitis with  tenosynovitis  - xray hand: Dorsal soft tissue swelling without evidence of osseous erosion or soft tissue gas. No radiopaque foreign body.  - CT right hand:  Small/moderate multilevel flexor tendon tenosynovitis at level of the metacarpals. Small distal radioulnar, radiocarpal and intercarpal joint effusion. Dorsal hand and distal forearm subcutaneous edema without drainable fluid collection.  No CT evidence of osteomyelitis.  - Sedimentation Rate, Erythrocyte: 87 mm/Hr (04.24.25 @ 13:05)  - C-Reactive Protein: 66.2 mg/L (04.24.25 @ 13:05)  - S/p  Unasyn 3 gm iv q6h  - S/p DT vaccine  - Hand surgery : no acute surgical intervention   - hand elevation to reduce swelling   - ID F/u: Augmentin 875 mg q12h and po Doxycycline 100 mg q12h till 5/5    # DM type 2  - A1C 6.8. %  - c/w home meds    # Hypertension  - c/w losartan    # Dyslipidemia  -c/w statin , ezetimibe          65 y/o female h/o HTN, DL, ? DM was gardening outside and cut her right hand several days ago, subsequently developed right hand diffuse pain and swelling, denies fever, tactile temp, chills, paresthesias, focal weakness, or other associated complaints at present. Pt denies recent heavy lifting or trauma.    In the ED vitals:  · /88  Heart Rate 114 /min RR 18 /min Temp 98.2 Degrees F SpO2 (%) 100 % on RA    -Labs showed ESR in 80s, CRP 60s, wbc normal, other labs unremarkable  -xray hand: Dorsal soft tissue swelling without evidence of osseous erosion or soft                          tissue gas. No radiopaque foreign body.    Surgery evaluation: recommended abx, ID cx, CT right hand, Beau block    # Right Hand cellulitis with  tenosynovitis  - xray hand: Dorsal soft tissue swelling without evidence of osseous erosion or soft tissue gas. No radiopaque foreign body.  - CT right hand:  Small/moderate multilevel flexor tendon tenosynovitis at level of the metacarpals. Small distal radioulnar, radiocarpal and intercarpal joint effusion. Dorsal hand and distal forearm subcutaneous edema without drainable fluid collection.  No CT evidence of osteomyelitis.  - Sedimentation Rate, Erythrocyte: 87 mm/Hr (04.24.25 @ 13:05)  - C-Reactive Protein: 66.2 mg/L (04.24.25 @ 13:05)  - S/p  Unasyn 3 gm iv q6h  - DT vaccine- as per nurse pt refused  - Hand surgery : no acute surgical intervention   - hand elevation to reduce swelling   - ID F/u: Augmentin 875 mg q12h and po Doxycycline 100 mg q12h till 5/5    # DM type 2  - A1C 6.8. %  - c/w home meds    # Hypertension  - c/w losartan    # Dyslipidemia  -c/w statin , ezetimibe

## 2025-04-26 NOTE — DISCHARGE NOTE PROVIDER - NSDCCPCAREPLAN_GEN_ALL_CORE_FT
PRINCIPAL DISCHARGE DIAGNOSIS  Diagnosis: Cellulitis  Assessment and Plan of Treatment: You were admitted for infection of hand tendons. You were seen by plastic surgery and no surgical intervention was warranted.  You received IV antibiotics and should continue the course for total of 10 days. Please take medication as prescribed  In case of worsening hand edema or pain, please come back to the ED

## 2025-04-26 NOTE — PROGRESS NOTE ADULT - ASSESSMENT
· Assessment	  65 y/o female h/o HTN, DL, ? DM was gardening outside and cut her right hand several days ago, subsequently developed right hand diffuse pain and swelling, denies fever, tactile temp, chills, paresthesias, focal weakness, or other associated complaints at present. Pt denies recent heavy lifting or trauma.      -Labs showed ESR in 80s, CRP 60s, wbc normal, other labs unremarkable      ID consulted for diagnostic work up and antimicrobial treatment of cellulitis    IMPRESSION/RECOMMENDATIONS  Immunosuppression/Immunosenescence ( above age 60 yrs there is a exponential decline in immunity which could result in poor clinical outcomes.   Cellulitis right hand ( dorsum ) with tenosynovitis and reactive synovitis : clinically improved  Hand Sx : no surgical intervention at this point  No septic arthritis  No abscess  No OM  Bacterial process   No evidence clinically of a fungal infection  4/24 BCX NG  WBC 8.3    < from: CT Hand w/ IV Cont, Right (04.24.25 @ 17:09) >  1.  Common flexor tendon sheath edema and inflammatory change, most consistent with tenosynovitis, which may be pyogenic in etiology.   Recommend surgical consultation.  2.  Multiple small joint space effusions with thickened/enhancing synovium, which may represent reactive synovitis.    < end of copied text >  4/24 xray hand: Dorsal soft tissue swelling without evidence of osseous erosion or soft tissue gas. No radiopaque foreign body.  WBC 8.5  4/24 ESR/CRP 87/66.2    HTN  Diabetes    -FU with Hand as outpt  -Keep hand elevated  -start po Augmentin 875 mg q12h and po Doxycycline 100 mg q12h till 5/5    Discussion of management/test results( independently interpretated by me ) /antibiotic regimen  with external/primary medical team. Dr Goodwin

## 2025-04-26 NOTE — DISCHARGE NOTE PROVIDER - CARE PROVIDER_API CALL
Dany Gamboa  Plastic Surgery  [address] [place] [address], [address]  [place] [address]  Phone: [phone]  Fax: [phone]  Follow Up Time: 1 week   Dany Gamboa  Plastic Surgery  [address] [place] [address], [address]  [place] [address]  Phone: [phone]  Fax: [phone]  Follow Up Time: 1 week    YANIRA CHEN  [address] [place] [address]  Phone: [phone]  Fax: ([phone]  Follow Up Time: 1 week

## 2025-04-26 NOTE — PROGRESS NOTE ADULT - SUBJECTIVE AND OBJECTIVE BOX
GENERAL SURGERY PROGRESS NOTE     CASSIDY ESCALERA  [address] [place] [address] day :3d    POD:  Procedure:   Surgical Attending: Dany Gamboa  Overnight events: No acute events overnight. Pt reports more range of motion with flexion of digits however still unable to extend digits. Pain with passive ROM with extension. Erythema and swelling improving.     T(F): 98.7 (04-25-25 @ 23:53), Max: 98.9 (04-25-25 @ 15:00)  HR: 82 (04-25-25 @ 23:53) (75 - 82)  BP: 142/88 (04-25-25 @ 23:53) (138/82 - 159/78)  ABP: --  ABP(mean): --  RR: 18 (04-25-25 @ 23:53) (18 - 18)  SpO2: 96% (04-25-25 @ 23:53) (96% - 98%)    IN'S / OUT's:      PHYSICAL EXAM:  GENERAL: NAD  CHEST/LUNG: Equal chest rise b/l, non labored breathing  HEART: Regular rate and rhythm  ABDOMEN: Soft, Nontender, Nondistended;   EXTREMITIES:  RUE with palpable Radial and ulnar pulse, no open wounds or fluctuance appreciated, digits held in flexion. Unable to actively extend digits but can actively flex digits. Pain with passive ROM in flexion but more severe with extension. Distal thumb abrasion healed. Tender to palpation over carpel tunnel. Sensation intact, extremity warm      LABS  Labs:  CAPILLARY BLOOD GLUCOSE      POCT Blood Glucose.: 171 mg/dL (25 Apr 2025 21:07)  POCT Blood Glucose.: 113 mg/dL (25 Apr 2025 16:26)  POCT Blood Glucose.: 131 mg/dL (25 Apr 2025 10:58)  POCT Blood Glucose.: 129 mg/dL (25 Apr 2025 07:17)                          12.3   8.60  )-----------( 255      ( 25 Apr 2025 18:18 )             36.2       Auto Immature Granulocyte %: 0.5 % (04-25-25 @ 18:18)  Auto Immature Granulocyte %: 0.6 % (04-25-25 @ 05:42)    04-25    136  |  101  |  8[L]  ----------------------------<  137[H]  4.4   |  23  |  0.5[L]      Calcium: 8.9 mg/dL (04-25-25 @ 05:42)      LFTs:             6.8  | 0.9  | 15       ------------------[100     ( 25 Apr 2025 05:42 )  3.5  | x    | 18          Lipase:x      Amylase:x         Lactate, Blood: 1.1 mmol/L (04-24-25 @ 13:05)      Coags:     12.10  ----< 1.02    ( 25 Apr 2025 05:42 )     x                   Urinalysis Basic - ( 25 Apr 2025 05:42 )    Color: x / Appearance: x / SG: x / pH: x  Gluc: 137 mg/dL / Ketone: x  / Bili: x / Urobili: x   Blood: x / Protein: x / Nitrite: x   Leuk Esterase: x / RBC: x / WBC x   Sq Epi: x / Non Sq Epi: x / Bacteria: x        Culture - Blood (collected 24 Apr 2025 13:12)  Source: Blood Blood-Peripheral  Preliminary Report (25 Apr 2025 22:02):    No growth at 24 hours    Culture - Blood (collected 24 Apr 2025 13:12)  Source: Blood Blood-Peripheral  Preliminary Report (25 Apr 2025 22:02):    No growth at 24 hours          RADIOLOGY & ADDITIONAL TESTS:      A/P:  CASSIDY ESCALERA is a 64yF w/ no PMH presenting with 4 days of worsening right hand pain and swelling after gardening on Sunday      PLAN:   - No acute surgical intervention  - continue RUE elevation  - continue Unasyn  - monitor WBC  - rest of care per primary team      #Antibiotics: ampicillin/sulbactam  IVPB 3 Gram(s) IV Intermittent every 6 hours, 04-25-25 @ 18:00  ampicillin/sulbactam  IVPB    , 04-25-25 @ 12:02   Day **  #DVT ppx: aspirin enteric coated 81 milliGRAM(s) Oral daily  enoxaparin Injectable 40 milliGRAM(s) SubCutaneous every 24 hours    Disposition:  4B    Above plan to be discussed with Attending Surgeon Dr. Gamboa  , patient, patient family, and rest of health care team    [employer] [id number]

## 2025-04-26 NOTE — DISCHARGE NOTE PROVIDER - PROVIDER TOKENS
PROVIDER:[TOKEN:[[id number]],FOLLOWUP:[1 week]] PROVIDER:[TOKEN:[[id number]],FOLLOWUP:[1 week]],PROVIDER:[TOKEN:[[id number]],FOLLOWUP:[1 week]]

## 2025-04-26 NOTE — PROGRESS NOTE ADULT - TIME BILLING
I have personally seen and examined this patient. I have reviewed all pertinent clinical information and reviewed all relevant imaging ( and noted the impression from the Radiologist ) and diagnostic studies personally. I counseled the patient about the diagnostic testing and treatment plan. I discussed my recommendations with the primary team. Time spent teaching was excluded.

## 2025-04-26 NOTE — DISCHARGE NOTE PROVIDER - ATTENDING DISCHARGE PHYSICAL EXAMINATION:
T(C): 36.7 (04-28-25 @ 08:02), Max: 36.8 (04-27-25 @ 15:30)  HR: 74 (04-28-25 @ 08:02) (74 - 90)  BP: 146/89 (04-28-25 @ 08:02) (129/78 - 154/84)  RR: 16 (04-28-25 @ 08:02) (16 - 18)  SpO2: 97% (04-28-25 @ 08:02) (97% - 97%)  O/E:  Awake, alert, not in distress.  HEENT: atraumatic, EOMI.  Chest: clear.  CVS: SIS2 +, no murmur.  P/A: Soft, BS+  CNS: awake, alert  Ext: no edema feet. Right hand swelling +decreasing , dressing+  All systems reviewed positive findings as above.

## 2025-04-27 LAB
ALBUMIN SERPL ELPH-MCNC: 3.4 G/DL — LOW (ref 3.5–5.2)
ALP SERPL-CCNC: 94 U/L — SIGNIFICANT CHANGE UP (ref 30–115)
ALT FLD-CCNC: 16 U/L — SIGNIFICANT CHANGE UP (ref 0–41)
ANION GAP SERPL CALC-SCNC: 13 MMOL/L — SIGNIFICANT CHANGE UP (ref 7–14)
AST SERPL-CCNC: 17 U/L — SIGNIFICANT CHANGE UP (ref 0–41)
BASOPHILS # BLD AUTO: 0.03 K/UL — SIGNIFICANT CHANGE UP (ref 0–0.2)
BASOPHILS NFR BLD AUTO: 0.3 % — SIGNIFICANT CHANGE UP (ref 0–1)
BILIRUB SERPL-MCNC: 0.5 MG/DL — SIGNIFICANT CHANGE UP (ref 0.2–1.2)
BUN SERPL-MCNC: 14 MG/DL — SIGNIFICANT CHANGE UP (ref 10–20)
CALCIUM SERPL-MCNC: 8.8 MG/DL — SIGNIFICANT CHANGE UP (ref 8.4–10.5)
CHLORIDE SERPL-SCNC: 101 MMOL/L — SIGNIFICANT CHANGE UP (ref 98–110)
CO2 SERPL-SCNC: 23 MMOL/L — SIGNIFICANT CHANGE UP (ref 17–32)
CREAT SERPL-MCNC: 0.5 MG/DL — LOW (ref 0.7–1.5)
EGFR: 103 ML/MIN/1.73M2 — SIGNIFICANT CHANGE UP
EGFR: 103 ML/MIN/1.73M2 — SIGNIFICANT CHANGE UP
EOSINOPHIL # BLD AUTO: 0.16 K/UL — SIGNIFICANT CHANGE UP (ref 0–0.7)
EOSINOPHIL NFR BLD AUTO: 1.7 % — SIGNIFICANT CHANGE UP (ref 0–8)
GLUCOSE BLDC GLUCOMTR-MCNC: 113 MG/DL — HIGH (ref 70–99)
GLUCOSE BLDC GLUCOMTR-MCNC: 141 MG/DL — HIGH (ref 70–99)
GLUCOSE BLDC GLUCOMTR-MCNC: 199 MG/DL — HIGH (ref 70–99)
GLUCOSE BLDC GLUCOMTR-MCNC: 244 MG/DL — HIGH (ref 70–99)
GLUCOSE SERPL-MCNC: 115 MG/DL — HIGH (ref 70–99)
HCT VFR BLD CALC: 35 % — LOW (ref 37–47)
HGB BLD-MCNC: 11.5 G/DL — LOW (ref 12–16)
IMM GRANULOCYTES NFR BLD AUTO: 0.5 % — HIGH (ref 0.1–0.3)
LYMPHOCYTES # BLD AUTO: 1.05 K/UL — LOW (ref 1.2–3.4)
LYMPHOCYTES # BLD AUTO: 11.1 % — LOW (ref 20.5–51.1)
MAGNESIUM SERPL-MCNC: 2.1 MG/DL — SIGNIFICANT CHANGE UP (ref 1.8–2.4)
MCHC RBC-ENTMCNC: 30.3 PG — SIGNIFICANT CHANGE UP (ref 27–31)
MCHC RBC-ENTMCNC: 32.9 G/DL — SIGNIFICANT CHANGE UP (ref 32–37)
MCV RBC AUTO: 92.1 FL — SIGNIFICANT CHANGE UP (ref 81–99)
MONOCYTES # BLD AUTO: 0.99 K/UL — HIGH (ref 0.1–0.6)
MONOCYTES NFR BLD AUTO: 10.5 % — HIGH (ref 1.7–9.3)
NEUTROPHILS # BLD AUTO: 7.15 K/UL — HIGH (ref 1.4–6.5)
NEUTROPHILS NFR BLD AUTO: 75.9 % — HIGH (ref 42.2–75.2)
NRBC BLD AUTO-RTO: 0 /100 WBCS — SIGNIFICANT CHANGE UP (ref 0–0)
PLATELET # BLD AUTO: 312 K/UL — SIGNIFICANT CHANGE UP (ref 130–400)
PMV BLD: 9.3 FL — SIGNIFICANT CHANGE UP (ref 7.4–10.4)
POTASSIUM SERPL-MCNC: 4.5 MMOL/L — SIGNIFICANT CHANGE UP (ref 3.5–5)
POTASSIUM SERPL-SCNC: 4.5 MMOL/L — SIGNIFICANT CHANGE UP (ref 3.5–5)
PROT SERPL-MCNC: 6.7 G/DL — SIGNIFICANT CHANGE UP (ref 6–8)
RBC # BLD: 3.8 M/UL — LOW (ref 4.2–5.4)
RBC # FLD: 14.2 % — SIGNIFICANT CHANGE UP (ref 11.5–14.5)
SODIUM SERPL-SCNC: 137 MMOL/L — SIGNIFICANT CHANGE UP (ref 135–146)
WBC # BLD: 9.43 K/UL — SIGNIFICANT CHANGE UP (ref 4.8–10.8)
WBC # FLD AUTO: 9.43 K/UL — SIGNIFICANT CHANGE UP (ref 4.8–10.8)

## 2025-04-27 PROCEDURE — 99233 SBSQ HOSP IP/OBS HIGH 50: CPT

## 2025-04-27 RX ORDER — TRAMADOL HYDROCHLORIDE 50 MG/1
50 TABLET, FILM COATED ORAL EVERY 8 HOURS
Refills: 0 | Status: DISCONTINUED | OUTPATIENT
Start: 2025-04-27 | End: 2025-04-28

## 2025-04-27 RX ADMIN — AMPICILLIN SODIUM AND SULBACTAM SODIUM 200 GRAM(S): 1; .5 INJECTION, POWDER, FOR SOLUTION INTRAMUSCULAR; INTRAVENOUS at 05:03

## 2025-04-27 RX ADMIN — Medication 1 APPLICATION(S): at 05:06

## 2025-04-27 RX ADMIN — INSULIN LISPRO 2: 100 INJECTION, SOLUTION INTRAVENOUS; SUBCUTANEOUS at 17:36

## 2025-04-27 RX ADMIN — AMPICILLIN SODIUM AND SULBACTAM SODIUM 200 GRAM(S): 1; .5 INJECTION, POWDER, FOR SOLUTION INTRAMUSCULAR; INTRAVENOUS at 16:58

## 2025-04-27 RX ADMIN — ROSUVASTATIN CALCIUM 40 MILLIGRAM(S): 20 TABLET, FILM COATED ORAL at 22:08

## 2025-04-27 RX ADMIN — LOSARTAN POTASSIUM 25 MILLIGRAM(S): 100 TABLET, FILM COATED ORAL at 05:05

## 2025-04-27 RX ADMIN — TRAMADOL HYDROCHLORIDE 50 MILLIGRAM(S): 50 TABLET, FILM COATED ORAL at 22:35

## 2025-04-27 RX ADMIN — Medication 81 MILLIGRAM(S): at 11:09

## 2025-04-27 RX ADMIN — TRAMADOL HYDROCHLORIDE 50 MILLIGRAM(S): 50 TABLET, FILM COATED ORAL at 22:07

## 2025-04-27 RX ADMIN — EZETIMIBE 10 MILLIGRAM(S): 10 TABLET ORAL at 11:10

## 2025-04-27 RX ADMIN — GABAPENTIN 200 MILLIGRAM(S): 400 CAPSULE ORAL at 11:09

## 2025-04-27 RX ADMIN — ENOXAPARIN SODIUM 40 MILLIGRAM(S): 100 INJECTION SUBCUTANEOUS at 11:09

## 2025-04-27 RX ADMIN — AMPICILLIN SODIUM AND SULBACTAM SODIUM 200 GRAM(S): 1; .5 INJECTION, POWDER, FOR SOLUTION INTRAMUSCULAR; INTRAVENOUS at 23:20

## 2025-04-27 RX ADMIN — AMPICILLIN SODIUM AND SULBACTAM SODIUM 200 GRAM(S): 1; .5 INJECTION, POWDER, FOR SOLUTION INTRAMUSCULAR; INTRAVENOUS at 11:08

## 2025-04-27 RX ADMIN — INSULIN LISPRO 1: 100 INJECTION, SOLUTION INTRAVENOUS; SUBCUTANEOUS at 11:14

## 2025-04-27 NOTE — PROGRESS NOTE ADULT - ASSESSMENT
CASSIDY ESCALERA is a 64yF w/ no PMH presenting with 4 days of worsening right hand pain and swelling after gardening on Sunday      PLAN:   - No acute surgical intervention  - continue RUE elevation  - trend ESR and CRP  - continue Unasyn  - monitor WBC  - rest of care per primary team

## 2025-04-27 NOTE — PROGRESS NOTE ADULT - TIME BILLING
Direct patient care. Discussed on rounds with [facility] Direct patient care. Discussed on rounds with [employer]. used  line [id number]

## 2025-04-27 NOTE — PROGRESS NOTE ADULT - SUBJECTIVE AND OBJECTIVE BOX
Patient is a 64y old  Female who presents with a chief complaint of Hand swelling (25 Apr 2025 11:39)    Patient was seen and examined.  Continues to  right hand swelling  Denies any other complaints.  All systems reviewed positive history as mentioned above.    Allergies  No Known Allergies    MEDICATIONS  (STANDING):  ampicillin/sulbactam  IVPB 3 Gram(s) IV Intermittent every 6 hours  aspirin enteric coated 81 milliGRAM(s) Oral daily  chlorhexidine 2% Cloths 1 Application(s) Topical <User Schedule>  diphtheria/tetanus Vaccine - Adult 0.5 milliLiter(s) IntraMuscular once  enoxaparin Injectable 40 milliGRAM(s) SubCutaneous every 24 hours  ezetimibe 10 milliGRAM(s) Oral daily  gabapentin 200 milliGRAM(s) Oral daily  glucagon  Injectable 1 milliGRAM(s) IntraMuscular once  insulin lispro (ADMELOG) corrective regimen sliding scale   SubCutaneous three times a day before meals  losartan 25 milliGRAM(s) Oral daily  rosuvastatin 40 milliGRAM(s) Oral at bedtime    MEDICATIONS  (PRN):  acetaminophen     Tablet .. 650 milliGRAM(s) Oral every 6 hours PRN Mild Pain (1 - 3)  dextrose Oral Gel 15 Gram(s) Oral once PRN Blood Glucose LESS THAN 70 milliGRAM(s)/deciliter    T(C): 36.9 (04-27-25 @ 07:00), Max: 36.9 (04-27-25 @ 07:00)  HR: 83 (04-27-25 @ 07:00) (74 - 83)  BP: 135/84 (04-27-25 @ 07:00) (109/73 - 153/80)  RR: 17 (04-27-25 @ 07:00) (17 - 18)  SpO2: 98% (04-27-25 @ 07:00) (96% - 98%)    O/E:  Awake, alert, not in distress.  HEENT: atraumatic, EOMI.  Chest: clear.  CVS: SIS2 +, no murmur.  P/A: Soft, BS+  CNS: awake, alert  Ext: no edema feet. Right hand swelling +, dressing+  All systems reviewed positive findings as above.                          11.5[L]  9.43  )-----------( 312      ( 27 Apr 2025 05:18 )             35.0[L]                        12.4   8.36  )-----------( 296      ( 26 Apr 2025 06:37 )             37.9     04-27    137  |  101  |  14  ----------------------------<  115[H]  4.5   |  23  |  0.5[L]  04-26    139  |  101  |  12  ----------------------------<  121[H]  4.5   |  27  |  0.6[L]    Ca    8.8      27 Apr 2025 05:18  Ca    8.9      26 Apr 2025 06:37  Mg     2.1     04-27    TPro  6.7  /  Alb  3.4[L]  /  TBili  0.5  /  DBili  x   /  AST  17  /  ALT  16  /  AlkPhos  94  04-27  TPro  6.7  /  Alb  3.4[L]  /  TBili  0.7  /  DBili  x   /  AST  15  /  ALT  16  /  AlkPhos  96  04-26

## 2025-04-27 NOTE — PROGRESS NOTE ADULT - ASSESSMENT
65 y/o female h/o HTN, DL, ? DM was gardening outside and cut her right hand several days ago, subsequently developed right hand diffuse pain and swelling, denies fever, tactile temp, chills, paresthesias, focal weakness, or other associated complaints at present. Pt denies recent heavy lifting or trauma.    # Right Hand cellulitis with  tenosynovitis  - xray hand: Dorsal soft tissue swelling without evidence of osseous erosion or soft tissue gas. No radiopaque foreign body.  - CT right hand:  Small/moderate multilevel flexor tendon tenosynovitis at level of the metacarpals. Small distal radioulnar, radiocarpal and intercarpal joint effusion. Dorsal hand and distal forearm subcutaneous edema without drainable fluid collection.  No CT evidence of osteomyelitis.  - Sedimentation Rate, Erythrocyte: 87 mm/Hr (04.24.25 @ 13:05)  - C-Reactive Protein: 66.2 mg/L (04.24.25 @ 13:05)  - c/w  Unasyn 3 gm iv q6h  - DT vaccine  - Hand surgery : no acute surgical intervention   - hand elevation to reduce swelling     # DM type2  - A1C with Estimated Average Glucose Result: 6.8. % (04.25.25 @ 05:42)  - monitor FS  - c/w insulin    # Hypertension  - c/w losartan    # Dyslipidemia  -c/w statin , ezetimibe     # DVT prophylaxis    # Full code    # Pending: clinical improvement  # Disposition: Home when stable

## 2025-04-27 NOTE — PROGRESS NOTE ADULT - SUBJECTIVE AND OBJECTIVE BOX
GENERAL SURGERY PROGRESS NOTE    Patient: CASSIDY ESCALERA , 64y ([birth year])Female   MRN: [id number]  Location: [address]  Visit: 04-24-25 Inpatient  Date: 04-27-25 @ 03:30    Procedure/Dx/Injuries:     Events of past 24 hours: no acute events overnight. patient reports improving pain but still unable to extend digits. hand elevated, sensory function intact.     PAST MEDICAL & SURGICAL HISTORY:  No pertinent past medical history    Vitals:   T(F): 98 (04-26-25 @ 23:50), Max: 98.5 (04-26-25 @ 07:00)  HR: 77 (04-26-25 @ 23:50)  BP: 109/73 (04-26-25 @ 23:50)  RR: 18 (04-26-25 @ 23:50)  SpO2: 96% (04-26-25 @ 23:50)      Diet, DASH/TLC:   Sodium & Cholesterol Restricted      Fluids:     I & O's:    PHYSICAL EXAM:  GENERAL: NAD  CHEST/LUNG: Equal chest rise b/l, non labored breathing  HEART: Regular rate and rhythm  ABDOMEN: Soft, Nontender, Nondistended  EXTREMITIES:  RUE with palpable Radial and ulnar pulse, no open wounds or fluctuance appreciated, digits held in flexion. Unable to actively extend digits but can actively flex digits. Pain with passive ROM in flexion but more severe with extension. Distal thumb abrasion healed. Tender to palpation over carpel tunnel. Sensation intact, extremity warm    MEDICATIONS  (STANDING):  ampicillin/sulbactam  IVPB 3 Gram(s) IV Intermittent every 6 hours  aspirin enteric coated 81 milliGRAM(s) Oral daily  chlorhexidine 2% Cloths 1 Application(s) Topical <User Schedule>  dextrose 5%. 1000 milliLiter(s) (50 mL/Hr) IV Continuous <Continuous>  dextrose 5%. 1000 milliLiter(s) (100 mL/Hr) IV Continuous <Continuous>  dextrose 50% Injectable 25 Gram(s) IV Push once  dextrose 50% Injectable 12.5 Gram(s) IV Push once  dextrose 50% Injectable 25 Gram(s) IV Push once  diphtheria/tetanus Vaccine - Adult 0.5 milliLiter(s) IntraMuscular once  enoxaparin Injectable 40 milliGRAM(s) SubCutaneous every 24 hours  ezetimibe 10 milliGRAM(s) Oral daily  gabapentin 200 milliGRAM(s) Oral daily  glucagon  Injectable 1 milliGRAM(s) IntraMuscular once  insulin lispro (ADMELOG) corrective regimen sliding scale   SubCutaneous three times a day before meals  losartan 25 milliGRAM(s) Oral daily  rosuvastatin 40 milliGRAM(s) Oral at bedtime    MEDICATIONS  (PRN):  acetaminophen     Tablet .. 650 milliGRAM(s) Oral every 6 hours PRN Mild Pain (1 - 3)  dextrose Oral Gel 15 Gram(s) Oral once PRN Blood Glucose LESS THAN 70 milliGRAM(s)/deciliter      DVT PROPHYLAXIS: enoxaparin Injectable 40 milliGRAM(s) SubCutaneous every 24 hours     ANTIBIOTICS:  ampicillin/sulbactam  IVPB 3 Gram(s)      LAB/STUDIES:  Labs:  CAPILLARY BLOOD GLUCOSE      POCT Blood Glucose.: 173 mg/dL (26 Apr 2025 20:45)  POCT Blood Glucose.: 99 mg/dL (26 Apr 2025 16:44)  POCT Blood Glucose.: 166 mg/dL (26 Apr 2025 11:31)  POCT Blood Glucose.: 127 mg/dL (26 Apr 2025 07:16)                          12.4   8.36  )-----------( 296      ( 26 Apr 2025 06:37 )             37.9       Auto Immature Granulocyte %: 0.5 % (04-26-25 @ 06:37)    04-26    139  |  101  |  12  ----------------------------<  121[H]  4.5   |  27  |  0.6[L]      Calcium: 8.9 mg/dL (04-26-25 @ 06:37)      LFTs:             6.7  | 0.7  | 15       ------------------[96      ( 26 Apr 2025 06:37 )  3.4  | x    | 16           Lactate, Blood: 1.1 mmol/L (04-24-25 @ 13:05)      Coags:     12.10  ----< 1.02    ( 25 Apr 2025 05:42 )     x         Culture - Blood (collected 24 Apr 2025 13:12)  Source: Blood Blood-Peripheral  Preliminary Report (26 Apr 2025 22:01):    No growth at 48 Hours    Culture - Blood (collected 24 Apr 2025 13:12)  Source: Blood Blood-Peripheral  Preliminary Report (26 Apr 2025 22:01):    No growth at 48 Hours

## 2025-04-28 VITALS
OXYGEN SATURATION: 97 % | DIASTOLIC BLOOD PRESSURE: 89 MMHG | TEMPERATURE: 98 F | RESPIRATION RATE: 16 BRPM | HEART RATE: 74 BPM | SYSTOLIC BLOOD PRESSURE: 146 MMHG

## 2025-04-28 LAB
ALBUMIN SERPL ELPH-MCNC: 3.4 G/DL — LOW (ref 3.5–5.2)
ALP SERPL-CCNC: 96 U/L — SIGNIFICANT CHANGE UP (ref 30–115)
ALT FLD-CCNC: 16 U/L — SIGNIFICANT CHANGE UP (ref 0–41)
ANION GAP SERPL CALC-SCNC: 10 MMOL/L — SIGNIFICANT CHANGE UP (ref 7–14)
AST SERPL-CCNC: 16 U/L — SIGNIFICANT CHANGE UP (ref 0–41)
BASOPHILS # BLD AUTO: 0.02 K/UL — SIGNIFICANT CHANGE UP (ref 0–0.2)
BASOPHILS NFR BLD AUTO: 0.2 % — SIGNIFICANT CHANGE UP (ref 0–1)
BILIRUB SERPL-MCNC: 0.6 MG/DL — SIGNIFICANT CHANGE UP (ref 0.2–1.2)
BUN SERPL-MCNC: 12 MG/DL — SIGNIFICANT CHANGE UP (ref 10–20)
CALCIUM SERPL-MCNC: 9.3 MG/DL — SIGNIFICANT CHANGE UP (ref 8.4–10.5)
CHLORIDE SERPL-SCNC: 97 MMOL/L — LOW (ref 98–110)
CO2 SERPL-SCNC: 25 MMOL/L — SIGNIFICANT CHANGE UP (ref 17–32)
CREAT SERPL-MCNC: 0.5 MG/DL — LOW (ref 0.7–1.5)
EGFR: 103 ML/MIN/1.73M2 — SIGNIFICANT CHANGE UP
EGFR: 103 ML/MIN/1.73M2 — SIGNIFICANT CHANGE UP
EOSINOPHIL # BLD AUTO: 0.14 K/UL — SIGNIFICANT CHANGE UP (ref 0–0.7)
EOSINOPHIL NFR BLD AUTO: 1.7 % — SIGNIFICANT CHANGE UP (ref 0–8)
GLUCOSE BLDC GLUCOMTR-MCNC: 120 MG/DL — HIGH (ref 70–99)
GLUCOSE BLDC GLUCOMTR-MCNC: 186 MG/DL — HIGH (ref 70–99)
GLUCOSE SERPL-MCNC: 114 MG/DL — HIGH (ref 70–99)
HCT VFR BLD CALC: 36 % — LOW (ref 37–47)
HGB BLD-MCNC: 12 G/DL — SIGNIFICANT CHANGE UP (ref 12–16)
IMM GRANULOCYTES NFR BLD AUTO: 0.5 % — HIGH (ref 0.1–0.3)
LYMPHOCYTES # BLD AUTO: 1.29 K/UL — SIGNIFICANT CHANGE UP (ref 1.2–3.4)
LYMPHOCYTES # BLD AUTO: 16 % — LOW (ref 20.5–51.1)
MAGNESIUM SERPL-MCNC: 2.2 MG/DL — SIGNIFICANT CHANGE UP (ref 1.8–2.4)
MCHC RBC-ENTMCNC: 30.2 PG — SIGNIFICANT CHANGE UP (ref 27–31)
MCHC RBC-ENTMCNC: 33.3 G/DL — SIGNIFICANT CHANGE UP (ref 32–37)
MCV RBC AUTO: 90.7 FL — SIGNIFICANT CHANGE UP (ref 81–99)
MONOCYTES # BLD AUTO: 0.87 K/UL — HIGH (ref 0.1–0.6)
MONOCYTES NFR BLD AUTO: 10.8 % — HIGH (ref 1.7–9.3)
NEUTROPHILS # BLD AUTO: 5.7 K/UL — SIGNIFICANT CHANGE UP (ref 1.4–6.5)
NEUTROPHILS NFR BLD AUTO: 70.8 % — SIGNIFICANT CHANGE UP (ref 42.2–75.2)
NRBC BLD AUTO-RTO: 0 /100 WBCS — SIGNIFICANT CHANGE UP (ref 0–0)
PLATELET # BLD AUTO: 322 K/UL — SIGNIFICANT CHANGE UP (ref 130–400)
PMV BLD: 8.9 FL — SIGNIFICANT CHANGE UP (ref 7.4–10.4)
POTASSIUM SERPL-MCNC: 4.4 MMOL/L — SIGNIFICANT CHANGE UP (ref 3.5–5)
POTASSIUM SERPL-SCNC: 4.4 MMOL/L — SIGNIFICANT CHANGE UP (ref 3.5–5)
PROT SERPL-MCNC: 6.6 G/DL — SIGNIFICANT CHANGE UP (ref 6–8)
RBC # BLD: 3.97 M/UL — LOW (ref 4.2–5.4)
RBC # FLD: 13.9 % — SIGNIFICANT CHANGE UP (ref 11.5–14.5)
SODIUM SERPL-SCNC: 132 MMOL/L — LOW (ref 135–146)
WBC # BLD: 8.06 K/UL — SIGNIFICANT CHANGE UP (ref 4.8–10.8)
WBC # FLD AUTO: 8.06 K/UL — SIGNIFICANT CHANGE UP (ref 4.8–10.8)

## 2025-04-28 PROCEDURE — 99239 HOSP IP/OBS DSCHRG MGMT >30: CPT

## 2025-04-28 RX ORDER — ACETAMINOPHEN 500 MG/5ML
2 LIQUID (ML) ORAL
Qty: 40 | Refills: 0
Start: 2025-04-28 | End: 2025-05-02

## 2025-04-28 RX ORDER — TRAMADOL HYDROCHLORIDE 50 MG/1
1 TABLET, FILM COATED ORAL
Qty: 21 | Refills: 0
Start: 2025-04-28 | End: 2025-05-04

## 2025-04-28 RX ADMIN — TRAMADOL HYDROCHLORIDE 50 MILLIGRAM(S): 50 TABLET, FILM COATED ORAL at 07:10

## 2025-04-28 RX ADMIN — AMPICILLIN SODIUM AND SULBACTAM SODIUM 200 GRAM(S): 1; .5 INJECTION, POWDER, FOR SOLUTION INTRAMUSCULAR; INTRAVENOUS at 06:39

## 2025-04-28 RX ADMIN — TRAMADOL HYDROCHLORIDE 50 MILLIGRAM(S): 50 TABLET, FILM COATED ORAL at 06:40

## 2025-04-28 RX ADMIN — LOSARTAN POTASSIUM 25 MILLIGRAM(S): 100 TABLET, FILM COATED ORAL at 06:37

## 2025-04-28 NOTE — PROGRESS NOTE ADULT - PROVIDER SPECIALTY LIST ADULT
Plastic Surgery
Internal Medicine
Plastic Surgery
Infectious Disease
Hospitalist
Patient/EMS

## 2025-04-28 NOTE — DISCHARGE NOTE NURSING/CASE MANAGEMENT/SOCIAL WORK - PATIENT PORTAL LINK FT
You can access the FollowMyHealth Patient Portal offered by [facility] by registering at the following website: [email or web address]. By joining [employer]’s FollowMyHealth portal, you will also be able to view your health information using other applications (apps) compatible with our system.

## 2025-04-28 NOTE — PROGRESS NOTE ADULT - ASSESSMENT
A: CASSIDY ESCALERA is a 64yF w/ no PMH presenting with worsening right hand pain and swelling after gardening on Sunday 4/20. Patient slowly improving, but hand still very swollen and ROM limited by swelling/pain.     Most likely prolonged cellulitis with slow resolution of swelling. Provided another pillow for hand elevation. Will follow AM labs.       PLAN:   - No acute surgical intervention  - continue RUE elevation  - trend ESR and CRP  - continue Unasyn  - monitor WBC  - rest of care per primary team    Emile Santiagosh  Plastic Surgery  #[phone] [facility] pager  [phone] [facility] pager  Available on teams

## 2025-04-28 NOTE — DISCHARGE NOTE NURSING/CASE MANAGEMENT/SOCIAL WORK - FINANCIAL ASSISTANCE
[facility] provides services at a reduced cost to those who are determined to be eligible through [facility]’s financial assistance program. Information regarding [facility]’s financial assistance program can be found by going to [email or web address] or by calling [phone].

## 2025-04-28 NOTE — DISCHARGE NOTE NURSING/CASE MANAGEMENT/SOCIAL WORK - NSDCPEFALRISK_GEN_ALL_CORE
For information on Fall & Injury Prevention, visit: [email or web address] OR  [email or web address] OR  https://www.cdc.gov/steadi/patient.html

## 2025-04-28 NOTE — PROGRESS NOTE ADULT - REASON FOR ADMISSION
Hand swelling

## 2025-04-28 NOTE — PROGRESS NOTE ADULT - SUBJECTIVE AND OBJECTIVE BOX
[facility] Daily Progress Note  =====================================================    SUBJECTIVE: Patient seen and examined at bedside on AM rounds. Mandarin  used on AM rounds. Patient reports that pain is slightly improved, but ROM is still very limited.       ALLERGIES:  No Known Allergies      --------------------------------------------------------------------------------------    MEDICATIONS:    Neurologic Medications  acetaminophen     Tablet .. 650 milliGRAM(s) Oral every 6 hours PRN Mild Pain (1 - 3)  gabapentin 200 milliGRAM(s) Oral daily  traMADol 50 milliGRAM(s) Oral every 8 hours PRN Moderate Pain (4 - 6)    Respiratory Medications    Cardiovascular Medications  losartan 25 milliGRAM(s) Oral daily    Gastrointestinal Medications  dextrose 5%. 1000 milliLiter(s) IV Continuous <Continuous>  dextrose 5%. 1000 milliLiter(s) IV Continuous <Continuous>    Genitourinary Medications    Hematologic/Oncologic Medications  aspirin enteric coated 81 milliGRAM(s) Oral daily  diphtheria/tetanus Vaccine - Adult 0.5 milliLiter(s) IntraMuscular once  enoxaparin Injectable 40 milliGRAM(s) SubCutaneous every 24 hours    Antimicrobial/Immunologic Medications  ampicillin/sulbactam  IVPB 3 Gram(s) IV Intermittent every 6 hours    Endocrine/Metabolic Medications  dextrose 50% Injectable 25 Gram(s) IV Push once  dextrose 50% Injectable 25 Gram(s) IV Push once  dextrose 50% Injectable 12.5 Gram(s) IV Push once  dextrose Oral Gel 15 Gram(s) Oral once PRN Blood Glucose LESS THAN 70 milliGRAM(s)/deciliter  ezetimibe 10 milliGRAM(s) Oral daily  glucagon  Injectable 1 milliGRAM(s) IntraMuscular once  insulin lispro (ADMELOG) corrective regimen sliding scale   SubCutaneous three times a day before meals  rosuvastatin 40 milliGRAM(s) Oral at bedtime    Topical/Other Medications  chlorhexidine 2% Cloths 1 Application(s) Topical <User Schedule>    --------------------------------------------------------------------------------------    VITAL SIGNS:  Vital Signs Last 24 Hrs  T(C): 36.6 (28 Apr 2025 01:02), Max: 36.8 (27 Apr 2025 15:30)  T(F): 97.8 (28 Apr 2025 01:02), Max: 98.3 (27 Apr 2025 15:30)  HR: 80 (28 Apr 2025 06:30) (80 - 90)  BP: 129/78 (28 Apr 2025 06:30) (129/78 - 154/84)  BP(mean): --  ABP: --  ABP(mean): --  RR: 18 (28 Apr 2025 01:02) (18 - 18)  SpO2: 97% (28 Apr 2025 01:02) (97% - 97%)    O2 Parameters below as of 28 Apr 2025 01:02  Patient On (Oxygen Delivery Method): room air      --------------------------------------------------------------------------------------    EXAM    General: NAD, resting in bed comfortably.  Cardiac: regular rate, warm and well perfused  HAND  RUE with palpable Radial and ulnar pulse, no open wounds or fluctuance appreciate. Patient with limited (about 10 degrees) of active flexion/extension at MCP, PIP, DIP of all digits. Pain with passive ROM after about 20 degrees of flexion/extension. Distal thumb abrasion healed. SILT over median/radial/ulnar distribution. Patient with ROM of wrist limited by pain. Able to range about 20 degrees passively.     --------------------------------------------------------------------------------------    LABS                          11.5   9.43  )-----------( 312      ( 27 Apr 2025 05:18 )             35.0     04-27    137  |  101  |  14  ----------------------------<  115[H]  4.5   |  23  |  0.5[L]    Ca    8.8      27 Apr 2025 05:18  Mg     2.1     04-27    TPro  6.7  /  Alb  3.4[L]  /  TBili  0.5  /  DBili  x   /  AST  17  /  ALT  16  /  AlkPhos  94  04-27      --------------------------------------------------------------------------------------    INS AND OUTS:    I&O's Detail    --------------------------------------------------------------------------------------

## 2025-04-29 LAB
CULTURE RESULTS: SIGNIFICANT CHANGE UP
CULTURE RESULTS: SIGNIFICANT CHANGE UP
SPECIMEN SOURCE: SIGNIFICANT CHANGE UP
SPECIMEN SOURCE: SIGNIFICANT CHANGE UP

## 2025-05-13 DIAGNOSIS — S61.411A LACERATION WITHOUT FOREIGN BODY OF RIGHT HAND, INITIAL ENCOUNTER: ICD-10-CM

## 2025-05-13 DIAGNOSIS — L03.113 CELLULITIS OF RIGHT UPPER LIMB: ICD-10-CM
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
## 2025-05-13 DIAGNOSIS — E11.9 TYPE 2 DIABETES MELLITUS WITHOUT COMPLICATIONS: ICD-10-CM

## 2025-05-13 DIAGNOSIS — S60.444A EXTERNAL CONSTRICTION OF RIGHT RING FINGER, INITIAL ENCOUNTER: ICD-10-CM

## 2025-05-13 DIAGNOSIS — E78.5 HYPERLIPIDEMIA, UNSPECIFIED: ICD-10-CM
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
## 2025-05-13 DIAGNOSIS — I10 ESSENTIAL (PRIMARY) HYPERTENSION: ICD-10-CM

## 2025-05-13 DIAGNOSIS — M65.941 UNSPECIFIED SYNOVITIS AND TENOSYNOVITIS, RIGHT HAND: ICD-10-CM

## 2025-05-13 DIAGNOSIS — Z79.52 LONG TERM (CURRENT) USE OF SYSTEMIC STEROIDS: ICD-10-CM

## 2025-05-19 NOTE — DISCHARGE NOTE PROVIDER - DISCHARGE DIET
Please refill LOSARTAN for 90 days supply with refills    The pharmacy told our patient that we have not responded to their requests    [facility] to [facility]    If questions, please try  Phone # [phone]   DASH Diet DASH Diet/Consistent Carbohydrate Diabetic Diets